# Patient Record
Sex: MALE | Race: BLACK OR AFRICAN AMERICAN | Employment: UNEMPLOYED | URBAN - METROPOLITAN AREA
[De-identification: names, ages, dates, MRNs, and addresses within clinical notes are randomized per-mention and may not be internally consistent; named-entity substitution may affect disease eponyms.]

---

## 2020-08-10 ENCOUNTER — HOSPITAL ENCOUNTER (OUTPATIENT)
Age: 45
Setting detail: SPECIMEN
Discharge: HOME | End: 2020-08-10
Payer: COMMERCIAL

## 2020-08-11 LAB
C. TRACHOMATIS DNA ,URINE: NEGATIVE
N. GONORRHOEAE DNA, URINE: NEGATIVE
SOURCE: NORMAL
SPECIMEN DESCRIPTION: NORMAL
TRICHOMONAS VAGINALI, MOLECULAR: NEGATIVE

## 2020-08-24 ENCOUNTER — HOSPITAL ENCOUNTER (OUTPATIENT)
Age: 45
Setting detail: SPECIMEN
Discharge: HOME | End: 2020-08-24
Payer: COMMERCIAL

## 2020-08-24 LAB
ABSOLUTE EOS #: 0.13 K/UL (ref 0–0.44)
ABSOLUTE IMMATURE GRANULOCYTE: <0.03 K/UL (ref 0–0.3)
ABSOLUTE LYMPH #: 1.95 K/UL (ref 1.1–3.7)
ABSOLUTE MONO #: 0.83 K/UL (ref 0.1–1.2)
ALBUMIN SERPL-MCNC: 4.1 G/DL (ref 3.5–5.2)
ALBUMIN/GLOBULIN RATIO: 1.3 (ref 1–2.5)
ALP BLD-CCNC: 58 U/L (ref 40–129)
ALT SERPL-CCNC: 24 U/L (ref 5–41)
ANION GAP SERPL CALCULATED.3IONS-SCNC: 15 MMOL/L (ref 9–17)
AST SERPL-CCNC: 12 U/L
BASOPHILS # BLD: 1 % (ref 0–2)
BASOPHILS ABSOLUTE: 0.04 K/UL (ref 0–0.2)
BILIRUB SERPL-MCNC: 0.15 MG/DL (ref 0.3–1.2)
BUN BLDV-MCNC: 13 MG/DL (ref 6–20)
BUN/CREAT BLD: ABNORMAL (ref 9–20)
CALCIUM SERPL-MCNC: 9.4 MG/DL (ref 8.6–10.4)
CHLORIDE BLD-SCNC: 98 MMOL/L (ref 98–107)
CHOLESTEROL/HDL RATIO: 4.7
CHOLESTEROL: 179 MG/DL
CO2: 23 MMOL/L (ref 20–31)
CREAT SERPL-MCNC: 0.84 MG/DL (ref 0.7–1.2)
DIFFERENTIAL TYPE: NORMAL
EOSINOPHILS RELATIVE PERCENT: 2 % (ref 1–4)
GFR AFRICAN AMERICAN: >60 ML/MIN
GFR NON-AFRICAN AMERICAN: >60 ML/MIN
GFR SERPL CREATININE-BSD FRML MDRD: ABNORMAL ML/MIN/{1.73_M2}
GFR SERPL CREATININE-BSD FRML MDRD: ABNORMAL ML/MIN/{1.73_M2}
GLUCOSE BLD-MCNC: 195 MG/DL (ref 70–99)
HCT VFR BLD CALC: 47.9 % (ref 40.7–50.3)
HDLC SERPL-MCNC: 38 MG/DL
HEMOGLOBIN: 14.6 G/DL (ref 13–17)
IMMATURE GRANULOCYTES: 0 %
LDL CHOLESTEROL: 90 MG/DL (ref 0–130)
LYMPHOCYTES # BLD: 27 % (ref 24–43)
MCH RBC QN AUTO: 26.8 PG (ref 25.2–33.5)
MCHC RBC AUTO-ENTMCNC: 30.5 G/DL (ref 28.4–34.8)
MCV RBC AUTO: 87.9 FL (ref 82.6–102.9)
MONOCYTES # BLD: 12 % (ref 3–12)
NRBC AUTOMATED: 0 PER 100 WBC
PDW BLD-RTO: 13.4 % (ref 11.8–14.4)
PLATELET # BLD: 352 K/UL (ref 138–453)
PLATELET ESTIMATE: NORMAL
PMV BLD AUTO: 10.4 FL (ref 8.1–13.5)
POTASSIUM SERPL-SCNC: 4.1 MMOL/L (ref 3.7–5.3)
RBC # BLD: 5.45 M/UL (ref 4.21–5.77)
RBC # BLD: NORMAL 10*6/UL
SEG NEUTROPHILS: 58 % (ref 36–65)
SEGMENTED NEUTROPHILS ABSOLUTE COUNT: 4.18 K/UL (ref 1.5–8.1)
SODIUM BLD-SCNC: 136 MMOL/L (ref 135–144)
TOTAL PROTEIN: 7.2 G/DL (ref 6.4–8.3)
TRIGL SERPL-MCNC: 253 MG/DL
VLDLC SERPL CALC-MCNC: ABNORMAL MG/DL (ref 1–30)
WBC # BLD: 7.2 K/UL (ref 3.5–11.3)
WBC # BLD: NORMAL 10*3/UL

## 2021-02-12 ENCOUNTER — HOSPITAL ENCOUNTER (OUTPATIENT)
Age: 46
Setting detail: SPECIMEN
Discharge: HOME | End: 2021-02-12
Payer: COMMERCIAL

## 2021-02-12 LAB
ABSOLUTE EOS #: 0.14 K/UL (ref 0–0.44)
ABSOLUTE IMMATURE GRANULOCYTE: 0.03 K/UL (ref 0–0.3)
ABSOLUTE LYMPH #: 2.16 K/UL (ref 1.1–3.7)
ABSOLUTE MONO #: 0.79 K/UL (ref 0.1–1.2)
ALBUMIN SERPL-MCNC: 4.2 G/DL (ref 3.5–5.2)
ALBUMIN/GLOBULIN RATIO: 1.2 (ref 1–2.5)
ALP BLD-CCNC: 53 U/L (ref 40–129)
ALT SERPL-CCNC: 14 U/L (ref 5–41)
ANION GAP SERPL CALCULATED.3IONS-SCNC: 17 MMOL/L (ref 9–17)
AST SERPL-CCNC: 9 U/L
BASOPHILS # BLD: 1 % (ref 0–2)
BASOPHILS ABSOLUTE: 0.06 K/UL (ref 0–0.2)
BILIRUB SERPL-MCNC: 0.23 MG/DL (ref 0.3–1.2)
BUN BLDV-MCNC: 19 MG/DL (ref 6–20)
BUN/CREAT BLD: ABNORMAL (ref 9–20)
CALCIUM SERPL-MCNC: 10.3 MG/DL (ref 8.6–10.4)
CHLORIDE BLD-SCNC: 102 MMOL/L (ref 98–107)
CHOLESTEROL/HDL RATIO: 4.3
CHOLESTEROL: 186 MG/DL
CO2: 23 MMOL/L (ref 20–31)
CREAT SERPL-MCNC: 1.12 MG/DL (ref 0.7–1.2)
DIFFERENTIAL TYPE: NORMAL
EOSINOPHILS RELATIVE PERCENT: 2 % (ref 1–4)
GFR AFRICAN AMERICAN: >60 ML/MIN
GFR NON-AFRICAN AMERICAN: >60 ML/MIN
GFR SERPL CREATININE-BSD FRML MDRD: ABNORMAL ML/MIN/{1.73_M2}
GFR SERPL CREATININE-BSD FRML MDRD: ABNORMAL ML/MIN/{1.73_M2}
GLUCOSE BLD-MCNC: 140 MG/DL (ref 70–99)
HCT VFR BLD CALC: 43.7 % (ref 40.7–50.3)
HDLC SERPL-MCNC: 43 MG/DL
HEMOGLOBIN: 13.6 G/DL (ref 13–17)
IMMATURE GRANULOCYTES: 0 %
LDL CHOLESTEROL: 107 MG/DL (ref 0–130)
LYMPHOCYTES # BLD: 31 % (ref 24–43)
MCH RBC QN AUTO: 25.7 PG (ref 25.2–33.5)
MCHC RBC AUTO-ENTMCNC: 31.1 G/DL (ref 28.4–34.8)
MCV RBC AUTO: 82.6 FL (ref 82.6–102.9)
MONOCYTES # BLD: 11 % (ref 3–12)
NRBC AUTOMATED: 0 PER 100 WBC
PDW BLD-RTO: 13.9 % (ref 11.8–14.4)
PLATELET # BLD: 301 K/UL (ref 138–453)
PLATELET ESTIMATE: NORMAL
PMV BLD AUTO: 10.4 FL (ref 8.1–13.5)
POTASSIUM SERPL-SCNC: 4.8 MMOL/L (ref 3.7–5.3)
RBC # BLD: 5.29 M/UL (ref 4.21–5.77)
RBC # BLD: NORMAL 10*6/UL
SEG NEUTROPHILS: 55 % (ref 36–65)
SEGMENTED NEUTROPHILS ABSOLUTE COUNT: 3.82 K/UL (ref 1.5–8.1)
SODIUM BLD-SCNC: 142 MMOL/L (ref 135–144)
TOTAL PROTEIN: 7.6 G/DL (ref 6.4–8.3)
TRIGL SERPL-MCNC: 181 MG/DL
VLDLC SERPL CALC-MCNC: ABNORMAL MG/DL (ref 1–30)
WBC # BLD: 7 K/UL (ref 3.5–11.3)
WBC # BLD: NORMAL 10*3/UL

## 2021-07-28 ENCOUNTER — HOSPITAL ENCOUNTER (OUTPATIENT)
Age: 46
Setting detail: SPECIMEN
Discharge: HOME | End: 2021-07-28
Payer: COMMERCIAL

## 2021-07-28 LAB
CHOLESTEROL/HDL RATIO: 3.8
CHOLESTEROL: 160 MG/DL
HDLC SERPL-MCNC: 42 MG/DL
LDL CHOLESTEROL: 73 MG/DL (ref 0–130)
TRIGL SERPL-MCNC: 226 MG/DL
VLDLC SERPL CALC-MCNC: ABNORMAL MG/DL (ref 1–30)

## 2021-10-11 ENCOUNTER — HOSPITAL ENCOUNTER (OUTPATIENT)
Age: 46
Setting detail: SPECIMEN
Discharge: HOME | End: 2021-10-11

## 2021-11-20 ENCOUNTER — HOSPITAL ENCOUNTER (INPATIENT)
Age: 46
LOS: 3 days | Discharge: HOME | DRG: 871 | End: 2021-11-23
Attending: INTERNAL MEDICINE | Admitting: INTERNAL MEDICINE
Payer: COMMERCIAL

## 2021-11-20 ENCOUNTER — APPOINTMENT (OUTPATIENT)
Dept: GENERAL RADIOLOGY | Age: 46
DRG: 871 | End: 2021-11-20
Payer: COMMERCIAL

## 2021-11-20 ENCOUNTER — APPOINTMENT (OUTPATIENT)
Dept: CT IMAGING | Age: 46
DRG: 871 | End: 2021-11-20
Payer: COMMERCIAL

## 2021-11-20 DIAGNOSIS — U07.1 COVID-19: Primary | ICD-10-CM

## 2021-11-20 PROBLEM — J12.82 PNEUMONIA DUE TO COVID-19 VIRUS: Status: ACTIVE | Noted: 2021-11-20

## 2021-11-20 LAB
ADENOVIRUS F 40 41 PCR: NOT DETECTED
ALBUMIN SERPL-MCNC: 3.8 G/DL (ref 3.5–5.1)
ALP BLD-CCNC: 57 U/L (ref 38–126)
ALT SERPL-CCNC: 42 U/L (ref 11–66)
ANION GAP SERPL CALCULATED.3IONS-SCNC: 12 MEQ/L (ref 8–16)
AST SERPL-CCNC: 30 U/L (ref 5–40)
ASTROVIRUS PCR: NOT DETECTED
ATYPICAL LYMPHOCYTES: ABNORMAL %
BASOPHILS # BLD: 0.2 %
BASOPHILS ABSOLUTE: 0 THOU/MM3 (ref 0–0.1)
BILIRUB SERPL-MCNC: 0.3 MG/DL (ref 0.3–1.2)
BUN BLDV-MCNC: 17 MG/DL (ref 7–22)
C DIFF TOXIN/ANTIGEN: NEGATIVE
C-REACTIVE PROTEIN: 10.44 MG/DL (ref 0–1)
CALCIUM SERPL-MCNC: 8.7 MG/DL (ref 8.5–10.5)
CAMPYLOBACTER PCR: NOT DETECTED
CHLORIDE BLD-SCNC: 91 MEQ/L (ref 98–111)
CLOSTRIDIUM DIFFICILE, PCR: ABNORMAL
CO2: 28 MEQ/L (ref 23–33)
CREAT SERPL-MCNC: 1.2 MG/DL (ref 0.4–1.2)
CRYPTOSPORIDIUM PCR: NOT DETECTED
CYCLOSPORA CAYETANENSIS PCR: NOT DETECTED
E COLI 0157 PCR: ABNORMAL
E COLI ENTEROAGGREGATIVE PCR: DETECTED
E COLI ENTEROPATHOGENIC PCR: NOT DETECTED
E COLI ENTEROTOXIGENIC PCR: NOT DETECTED
E COLI SHIGA LIKE TOXIN PCR: NOT DETECTED
E COLI SHIGELLA/ENTEROINVASIVE PCR: NOT DETECTED
E HISTOLYTICA GI FILM ARRAY: NOT DETECTED
EOSINOPHIL # BLD: 0 %
EOSINOPHILS ABSOLUTE: 0 THOU/MM3 (ref 0–0.4)
ERYTHROCYTE [DISTWIDTH] IN BLOOD BY AUTOMATED COUNT: 13.8 % (ref 11.5–14.5)
ERYTHROCYTE [DISTWIDTH] IN BLOOD BY AUTOMATED COUNT: 41.7 FL (ref 35–45)
FERRITIN: 1361 NG/ML (ref 22–322)
GFR SERPL CREATININE-BSD FRML MDRD: 79 ML/MIN/1.73M2
GIARDIA LAMBLIA PCR: NOT DETECTED
GLUCOSE BLD-MCNC: 265 MG/DL (ref 70–108)
GLUCOSE BLD-MCNC: 298 MG/DL (ref 70–108)
GLUCOSE BLD-MCNC: 308 MG/DL (ref 70–108)
HCT VFR BLD CALC: 44.7 % (ref 42–52)
HEMOGLOBIN: 14.6 GM/DL (ref 14–18)
IMMATURE GRANS (ABS): 0.02 THOU/MM3 (ref 0–0.07)
IMMATURE GRANULOCYTES: 0.4 %
LACTIC ACID, SEPSIS: 1.1 MMOL/L (ref 0.5–1.9)
LYMPHOCYTES # BLD: 23.5 %
LYMPHOCYTES ABSOLUTE: 1.1 THOU/MM3 (ref 1–4.8)
MCH RBC QN AUTO: 27.2 PG (ref 26–33)
MCHC RBC AUTO-ENTMCNC: 32.7 GM/DL (ref 32.2–35.5)
MCV RBC AUTO: 83.4 FL (ref 80–94)
MONOCYTES # BLD: 11.9 %
MONOCYTES ABSOLUTE: 0.6 THOU/MM3 (ref 0.4–1.3)
NOROVIRUS GI GII PCR: NOT DETECTED
NUCLEATED RED BLOOD CELLS: 0 /100 WBC
OSMOLALITY CALCULATION: 273.5 MOSMOL/KG (ref 275–300)
PHOSPHORUS: 3.1 MG/DL (ref 2.4–4.7)
PLATELET # BLD: 181 THOU/MM3 (ref 130–400)
PLATELET ESTIMATE: ADEQUATE
PLESIOMONAS SHIGELLOIDES PCR: NOT DETECTED
PMV BLD AUTO: 10.4 FL (ref 9.4–12.4)
POTASSIUM REFLEX MAGNESIUM: 4.5 MEQ/L (ref 3.5–5.2)
PRO-BNP: 33.4 PG/ML (ref 0–450)
PROCALCITONIN: 0.18 NG/ML (ref 0.01–0.09)
RBC # BLD: 5.36 MILL/MM3 (ref 4.7–6.1)
ROTAVIRUS A PCR: NOT DETECTED
SALMONELLA PCR: NOT DETECTED
SAPOVIRUS PCR: NOT DETECTED
SARS-COV-2, NAAT: DETECTED
SCAN OF BLOOD SMEAR: NORMAL
SEG NEUTROPHILS: 64 %
SEGMENTED NEUTROPHILS ABSOLUTE COUNT: 3 THOU/MM3 (ref 1.8–7.7)
SODIUM BLD-SCNC: 131 MEQ/L (ref 135–145)
TOTAL PROTEIN: 7.3 G/DL (ref 6.1–8)
TROPONIN T: < 0.01 NG/ML
VIBRIO CHOLERAE PCR: NOT DETECTED
VIBRIO PCR: NOT DETECTED
WBC # BLD: 4.7 THOU/MM3 (ref 4.8–10.8)
YERSINIA ENTEROCOLITICA PCR: NOT DETECTED

## 2021-11-20 PROCEDURE — 36415 COLL VENOUS BLD VENIPUNCTURE: CPT

## 2021-11-20 PROCEDURE — 86140 C-REACTIVE PROTEIN: CPT

## 2021-11-20 PROCEDURE — 83880 ASSAY OF NATRIURETIC PEPTIDE: CPT

## 2021-11-20 PROCEDURE — 6370000000 HC RX 637 (ALT 250 FOR IP): Performed by: PHYSICIAN ASSISTANT

## 2021-11-20 PROCEDURE — 6360000002 HC RX W HCPCS: Performed by: STUDENT IN AN ORGANIZED HEALTH CARE EDUCATION/TRAINING PROGRAM

## 2021-11-20 PROCEDURE — 71275 CT ANGIOGRAPHY CHEST: CPT

## 2021-11-20 PROCEDURE — 6360000002 HC RX W HCPCS: Performed by: PHYSICIAN ASSISTANT

## 2021-11-20 PROCEDURE — 84145 PROCALCITONIN (PCT): CPT

## 2021-11-20 PROCEDURE — 87635 SARS-COV-2 COVID-19 AMP PRB: CPT

## 2021-11-20 PROCEDURE — 0097U HC GI PTHGN MULT REV TRANS & AMP PRB TECH 22 TRGT: CPT

## 2021-11-20 PROCEDURE — 87427 SHIGA-LIKE TOXIN AG IA: CPT

## 2021-11-20 PROCEDURE — 2580000003 HC RX 258: Performed by: STUDENT IN AN ORGANIZED HEALTH CARE EDUCATION/TRAINING PROGRAM

## 2021-11-20 PROCEDURE — 87040 BLOOD CULTURE FOR BACTERIA: CPT

## 2021-11-20 PROCEDURE — 1200000000 HC SEMI PRIVATE

## 2021-11-20 PROCEDURE — 87045 FECES CULTURE AEROBIC BACT: CPT

## 2021-11-20 PROCEDURE — 2580000003 HC RX 258: Performed by: INTERNAL MEDICINE

## 2021-11-20 PROCEDURE — 80053 COMPREHEN METABOLIC PANEL: CPT

## 2021-11-20 PROCEDURE — 6360000004 HC RX CONTRAST MEDICATION: Performed by: PHYSICIAN ASSISTANT

## 2021-11-20 PROCEDURE — 6370000000 HC RX 637 (ALT 250 FOR IP): Performed by: STUDENT IN AN ORGANIZED HEALTH CARE EDUCATION/TRAINING PROGRAM

## 2021-11-20 PROCEDURE — 99223 1ST HOSP IP/OBS HIGH 75: CPT | Performed by: INTERNAL MEDICINE

## 2021-11-20 PROCEDURE — 96374 THER/PROPH/DIAG INJ IV PUSH: CPT

## 2021-11-20 PROCEDURE — 99285 EMERGENCY DEPT VISIT HI MDM: CPT

## 2021-11-20 PROCEDURE — 94640 AIRWAY INHALATION TREATMENT: CPT

## 2021-11-20 PROCEDURE — 85025 COMPLETE CBC W/AUTO DIFF WBC: CPT

## 2021-11-20 PROCEDURE — XW0DXM6 INTRODUCTION OF BARICITINIB INTO MOUTH AND PHARYNX, EXTERNAL APPROACH, NEW TECHNOLOGY GROUP 6: ICD-10-PCS | Performed by: INTERNAL MEDICINE

## 2021-11-20 PROCEDURE — 87449 NOS EACH ORGANISM AG IA: CPT

## 2021-11-20 PROCEDURE — 2580000003 HC RX 258: Performed by: PHYSICIAN ASSISTANT

## 2021-11-20 PROCEDURE — 93005 ELECTROCARDIOGRAM TRACING: CPT | Performed by: PHYSICIAN ASSISTANT

## 2021-11-20 PROCEDURE — 71045 X-RAY EXAM CHEST 1 VIEW: CPT

## 2021-11-20 PROCEDURE — 6360000002 HC RX W HCPCS: Performed by: INTERNAL MEDICINE

## 2021-11-20 PROCEDURE — 82948 REAGENT STRIP/BLOOD GLUCOSE: CPT

## 2021-11-20 PROCEDURE — 84100 ASSAY OF PHOSPHORUS: CPT

## 2021-11-20 PROCEDURE — 6370000000 HC RX 637 (ALT 250 FOR IP): Performed by: INTERNAL MEDICINE

## 2021-11-20 PROCEDURE — 82728 ASSAY OF FERRITIN: CPT

## 2021-11-20 PROCEDURE — 83605 ASSAY OF LACTIC ACID: CPT

## 2021-11-20 PROCEDURE — 84484 ASSAY OF TROPONIN QUANT: CPT

## 2021-11-20 RX ORDER — GLIPIZIDE 10 MG/1
TABLET, FILM COATED, EXTENDED RELEASE ORAL
COMMUNITY
Start: 2021-10-19

## 2021-11-20 RX ORDER — ATORVASTATIN CALCIUM 20 MG/1
20 TABLET, FILM COATED ORAL DAILY
Status: DISCONTINUED | OUTPATIENT
Start: 2021-11-20 | End: 2021-11-23 | Stop reason: HOSPADM

## 2021-11-20 RX ORDER — ONDANSETRON 2 MG/ML
4 INJECTION INTRAMUSCULAR; INTRAVENOUS ONCE
Status: COMPLETED | OUTPATIENT
Start: 2021-11-20 | End: 2021-11-20

## 2021-11-20 RX ORDER — ONDANSETRON 4 MG/1
4 TABLET, ORALLY DISINTEGRATING ORAL EVERY 8 HOURS PRN
Status: DISCONTINUED | OUTPATIENT
Start: 2021-11-20 | End: 2021-11-23 | Stop reason: HOSPADM

## 2021-11-20 RX ORDER — SODIUM CHLORIDE 0.9 % (FLUSH) 0.9 %
5-40 SYRINGE (ML) INJECTION PRN
Status: DISCONTINUED | OUTPATIENT
Start: 2021-11-20 | End: 2021-11-23 | Stop reason: HOSPADM

## 2021-11-20 RX ORDER — 0.9 % SODIUM CHLORIDE 0.9 %
1000 INTRAVENOUS SOLUTION INTRAVENOUS ONCE
Status: COMPLETED | OUTPATIENT
Start: 2021-11-20 | End: 2021-11-20

## 2021-11-20 RX ORDER — 0.9 % SODIUM CHLORIDE 0.9 %
500 INTRAVENOUS SOLUTION INTRAVENOUS ONCE
Status: DISCONTINUED | OUTPATIENT
Start: 2021-11-20 | End: 2021-11-20

## 2021-11-20 RX ORDER — LANCETS
EACH MISCELLANEOUS
COMMUNITY
Start: 2021-09-02 | End: 2021-11-20

## 2021-11-20 RX ORDER — ONDANSETRON 2 MG/ML
4 INJECTION INTRAMUSCULAR; INTRAVENOUS EVERY 6 HOURS PRN
Status: DISCONTINUED | OUTPATIENT
Start: 2021-11-20 | End: 2021-11-23 | Stop reason: HOSPADM

## 2021-11-20 RX ORDER — TADALAFIL 5 MG/1
TABLET ORAL
COMMUNITY
Start: 2021-10-01

## 2021-11-20 RX ORDER — IPRATROPIUM BROMIDE AND ALBUTEROL SULFATE 2.5; .5 MG/3ML; MG/3ML
1 SOLUTION RESPIRATORY (INHALATION) ONCE
Status: COMPLETED | OUTPATIENT
Start: 2021-11-20 | End: 2021-11-20

## 2021-11-20 RX ORDER — PIOGLITAZONEHYDROCHLORIDE 45 MG/1
TABLET ORAL
COMMUNITY
Start: 2021-09-22

## 2021-11-20 RX ORDER — METFORMIN HYDROCHLORIDE 500 MG/1
TABLET, EXTENDED RELEASE ORAL
COMMUNITY
Start: 2021-08-25

## 2021-11-20 RX ORDER — GUAIFENESIN 100 MG/5ML
200 SOLUTION ORAL ONCE
Status: COMPLETED | OUTPATIENT
Start: 2021-11-20 | End: 2021-11-20

## 2021-11-20 RX ORDER — SODIUM CHLORIDE 9 MG/ML
INJECTION, SOLUTION INTRAVENOUS CONTINUOUS
Status: DISCONTINUED | OUTPATIENT
Start: 2021-11-20 | End: 2021-11-23

## 2021-11-20 RX ORDER — SODIUM CHLORIDE 0.9 % (FLUSH) 0.9 %
5-40 SYRINGE (ML) INJECTION EVERY 12 HOURS SCHEDULED
Status: DISCONTINUED | OUTPATIENT
Start: 2021-11-20 | End: 2021-11-23 | Stop reason: HOSPADM

## 2021-11-20 RX ORDER — TADALAFIL 10 MG/1
TABLET ORAL
COMMUNITY
Start: 2021-10-21

## 2021-11-20 RX ORDER — POLYETHYLENE GLYCOL 3350 17 G/17G
17 POWDER, FOR SOLUTION ORAL DAILY PRN
Status: DISCONTINUED | OUTPATIENT
Start: 2021-11-20 | End: 2021-11-23 | Stop reason: HOSPADM

## 2021-11-20 RX ORDER — DEXAMETHASONE 4 MG/1
8 TABLET ORAL ONCE
Status: COMPLETED | OUTPATIENT
Start: 2021-11-20 | End: 2021-11-20

## 2021-11-20 RX ORDER — ACETAMINOPHEN 325 MG/1
650 TABLET ORAL EVERY 6 HOURS PRN
Status: DISCONTINUED | OUTPATIENT
Start: 2021-11-20 | End: 2021-11-23 | Stop reason: HOSPADM

## 2021-11-20 RX ORDER — HYDROCHLOROTHIAZIDE 12.5 MG/1
CAPSULE, GELATIN COATED ORAL
COMMUNITY
Start: 2021-10-19

## 2021-11-20 RX ORDER — LISINOPRIL 20 MG/1
TABLET ORAL
COMMUNITY
Start: 2021-10-19

## 2021-11-20 RX ORDER — DEXTROSE MONOHYDRATE 50 MG/ML
100 INJECTION, SOLUTION INTRAVENOUS PRN
Status: DISCONTINUED | OUTPATIENT
Start: 2021-11-20 | End: 2021-11-23 | Stop reason: HOSPADM

## 2021-11-20 RX ORDER — DEXTROSE MONOHYDRATE 25 G/50ML
12.5 INJECTION, SOLUTION INTRAVENOUS PRN
Status: DISCONTINUED | OUTPATIENT
Start: 2021-11-20 | End: 2021-11-23 | Stop reason: HOSPADM

## 2021-11-20 RX ORDER — DEXAMETHASONE SODIUM PHOSPHATE 4 MG/ML
6 INJECTION, SOLUTION INTRA-ARTICULAR; INTRALESIONAL; INTRAMUSCULAR; INTRAVENOUS; SOFT TISSUE EVERY 24 HOURS
Status: DISCONTINUED | OUTPATIENT
Start: 2021-11-21 | End: 2021-11-23 | Stop reason: HOSPADM

## 2021-11-20 RX ORDER — ACETAMINOPHEN 650 MG/1
650 SUPPOSITORY RECTAL EVERY 6 HOURS PRN
Status: DISCONTINUED | OUTPATIENT
Start: 2021-11-20 | End: 2021-11-23 | Stop reason: HOSPADM

## 2021-11-20 RX ORDER — ATORVASTATIN CALCIUM 20 MG/1
TABLET, FILM COATED ORAL
COMMUNITY
Start: 2021-10-19

## 2021-11-20 RX ORDER — SODIUM CHLORIDE 9 MG/ML
25 INJECTION, SOLUTION INTRAVENOUS PRN
Status: DISCONTINUED | OUTPATIENT
Start: 2021-11-20 | End: 2021-11-23 | Stop reason: HOSPADM

## 2021-11-20 RX ORDER — ACETAMINOPHEN 500 MG
1000 TABLET ORAL ONCE
Status: COMPLETED | OUTPATIENT
Start: 2021-11-20 | End: 2021-11-20

## 2021-11-20 RX ORDER — NICOTINE POLACRILEX 4 MG
15 LOZENGE BUCCAL PRN
Status: DISCONTINUED | OUTPATIENT
Start: 2021-11-20 | End: 2021-11-23 | Stop reason: HOSPADM

## 2021-11-20 RX ADMIN — DEXAMETHASONE 8 MG: 4 TABLET ORAL at 12:23

## 2021-11-20 RX ADMIN — IPRATROPIUM BROMIDE AND ALBUTEROL SULFATE 1 AMPULE: .5; 3 SOLUTION RESPIRATORY (INHALATION) at 12:36

## 2021-11-20 RX ADMIN — SODIUM CHLORIDE: 9 INJECTION, SOLUTION INTRAVENOUS at 16:15

## 2021-11-20 RX ADMIN — ATORVASTATIN CALCIUM 20 MG: 20 TABLET, FILM COATED ORAL at 21:37

## 2021-11-20 RX ADMIN — ENOXAPARIN SODIUM 40 MG: 100 INJECTION SUBCUTANEOUS at 21:35

## 2021-11-20 RX ADMIN — SODIUM CHLORIDE 1000 ML: 9 INJECTION, SOLUTION INTRAVENOUS at 12:07

## 2021-11-20 RX ADMIN — ONDANSETRON 4 MG: 2 INJECTION INTRAMUSCULAR; INTRAVENOUS at 12:23

## 2021-11-20 RX ADMIN — ACETAMINOPHEN 1000 MG: 500 TABLET ORAL at 12:24

## 2021-11-20 RX ADMIN — BARICITINIB 4 MG: 2 TABLET, FILM COATED ORAL at 18:19

## 2021-11-20 RX ADMIN — IOPAMIDOL 80 ML: 755 INJECTION, SOLUTION INTRAVENOUS at 13:43

## 2021-11-20 RX ADMIN — GUAIFENESIN 200 MG: 200 SOLUTION ORAL at 12:23

## 2021-11-20 RX ADMIN — AZITHROMYCIN DIHYDRATE 500 MG: 500 INJECTION, POWDER, LYOPHILIZED, FOR SOLUTION INTRAVENOUS at 23:55

## 2021-11-20 RX ADMIN — SODIUM CHLORIDE 1000 ML: 9 INJECTION, SOLUTION INTRAVENOUS at 17:45

## 2021-11-20 RX ADMIN — INSULIN LISPRO 9 UNITS: 100 INJECTION, SOLUTION INTRAVENOUS; SUBCUTANEOUS at 18:56

## 2021-11-20 ASSESSMENT — ENCOUNTER SYMPTOMS
VOMITING: 0
WHEEZING: 1
DIARRHEA: 1
SORE THROAT: 0
CHEST TIGHTNESS: 1
ABDOMINAL PAIN: 0
SHORTNESS OF BREATH: 1

## 2021-11-20 NOTE — LETTER
[address] [facility] [address]  Phone: [phone]             November 23, 2021    Patient: Niya Cao. YOB: 1975   Date of Visit: 11/20/2021       To Whom It May Concern:    Marilou Montenegro was seen and treated in our facility  beginning 11/20/2021 until 11/23/2021. He may return to work on once cleared from family physican .       Sincerely,       Dr. Cynthia Swan         Signature:__________________________________

## 2021-11-20 NOTE — ED NOTES
Resting in bed. Requesting food. Food tray ordered. Voiced no other concerns. Reports \"feeling better\". Side rails up x2. Call light within reach.  Will monitor      Antonette Beard RN  11/20/21 5250

## 2021-11-20 NOTE — ED TRIAGE NOTES
Presents to ER with concerns of not feeling well. Pt reports he has felt ill since last Friday. Today pt has HA, fever, diarrhea, weakness. Pt unsure if he has been exposed to covid 19. Pt 90% upon arrival to ER. Placed on 2 L NC oxygen up to 95%.  Will monitor

## 2021-11-20 NOTE — H&P
Hospitalist - History & Physical      Patient: Niya Cao. Unit/Bed:[address] [place] [address]  YOB: 1975  MRN: [id number]   Acct: [de-identified]   PCP: Tamia Quintana, ARLEEN - NP    Date of Service: Pt seen/examined on 11/20/21  and Admitted to Inpatient with expected LOS greater than two midnights due to medical therapy. Chief Complaint:  Fatigue with diarrhea/nausea/vomiting    Assessment and Plan:  Acute hypoxic respiratory failure due to COVID-19 pneumonia: SIRS 3/4 (fever, tachycardia, tachypnea). Unvaccinated. Febrile (Tmax 101. 3) with cough, nausea, vomiting, diarrhea. Procal 0.18. CRP 10 Associated tachypnea intermittently. Will start on Decadron 6mg PO daily. Meets criteria for Baricitinib- pharmacy consulted. No indications for antibiotics at this time. Will give another 1L bolus and start on  cc/hr. Currently on 3L NC saturating 94%. Wean off as needed with SpO2 goal >90%. Not on oxygen at baseline. Current smoker. No other history of lung disease. Acute Diarrhea: with associated nausea and vomiting. Likely due to COVID-19. Has been going on for the last few days- unclear timeline. Will check GI panel to rule out other infectious cause. Anti-emetics prn. Patient given food in the ED and wants to eat so will give diet. Monitor. On IVF. PTOT. Mild hypovolemic hyponatremia: Na 131 on admission. Likely due to acute viral pneumonia with diarrhea. On IVF. Trend daily. Primary HTN: controlled. On Norvasc, HCTZ, Lisinopril, Troprol-XL. Holding at this time. Resume as appropriate. Monitor vitals and BMP. NIDDMII: HbA1c not on Epic. Will check it. On Metformin ER, Glipizide, and Actos at home- held inpatient. On high dose SSI. BG inpatient goal 140-180. Monitor and adjust as needed. HLD: on statin. Home meds include Lipitor 20 mg daily as well as Pravastatin 40 mg daily? Will do med reconciliation. SAM: will clarify if using CPAP.         Code Status:FULL    Tele:   [x] yes [] no    Diet: regular    DVT prophylaxis: [x] Lovenox                                 [] SCDs                                 [] SQ Heparin                                 [] Encourage ambulation                                 [] Already on Anticoagulation      Disposition:      [] Home                             [] TCU                             [] Rehab                             [] Psych                             [] SNF                             [] [facility]                             [x] Other- TBD. History Of Present Illness:    56 yo M current smoker with history of SAM, HTN, and T2DM presented to [facility] ED with complaints of acute generalized weakness, diarrhea, and dry cough x 1 week. Presented febrile 101.3 with associated tachycardia 105 and tachypnea 24. Saturating 90 on RA. Placed on 3L NC. Labs significant for Na 131, Ferritin 1361, CRP 10.44, COVID positive. Lactate normal. No leukocytosis. CXR showed new bilateral pulmonary opacities. CTA chest with no PE, revealed mild to moderate bilateral patchy pulmonary opacities consistent with infiltrates. Given 1L IVNS and Decadron 8 mg PO x1 dose in the ED. Admitted for acute hypoxic respiratory failure. On evaluation, patient reports his symptoms of cough, fatigue started last weekend. Has had diarrhea, nausea, vomiting over the last few days. Could not go to work yesterday due to generalized weakness, nausea, vomiting. Smokes ~0.5 ppd x 20+ years. No alcohol or other drug use. Currently feeling tired, nauseated. Last emesis today. But wants to eat, feeling hungry.        Past Medical History:        Diagnosis Date    Heartburn     Hyperlipidemia     Hypertension     Restless legs syndrome     Sleep apnea     SOB (shortness of breath)     Type II or unspecified type diabetes mellitus without mention of complication, not stated as uncontrolled [facility])        Past Surgical History:      Procedure Laterality Date    CYST REMOVAL      wrist       Home Medications:   No current facility-administered medications on file prior to encounter. Current Outpatient Medications on File Prior to Encounter   Medication Sig Dispense Refill    metoprolol (TOPROL-XL) 50 MG XL tablet Take 1 tablet by mouth daily. 30 tablet 0    pravastatin (PRAVACHOL) 40 MG tablet Take 1 tablet by mouth daily. 30 tablet 0    amLODIPine (NORVASC) 10 MG tablet Take 1 tablet by mouth daily for 30 days. 30 tablet 0    metFORMIN ER, MOD, (GLUMETZA) 1000 MG ER tablet Take 1 tablet by mouth 2 times daily (with meals) for 30 days. 60 tablet 0    GLIPIZIDE XL 10 MG extended release tablet TAKE ONE TABLET BY MOUTH TWICE DAILY with meals      hydroCHLOROthiazide (MICROZIDE) 12.5 MG capsule TAKE ONE CAPSULE BY MOUTH ONCE DAILY      Accu-Chek Softclix Lancets MISC USE TO CHECK BLOOD SUGARS TWICE DAILY      lisinopril (PRINIVIL;ZESTRIL) 20 MG tablet TAKE ONE TABLET BY MOUTH ONCE DAILY      tadalafil (CIALIS) 5 MG tablet TAKE ONE TABLET BY MOUTH DAILY ONLY AS NEEDED      tadalafil (CIALIS) 10 MG tablet TAKE ONE TABLET BY MOUTH ONCE DAILY AS NEEDED      pioglitazone (ACTOS) 45 MG tablet TAKE ONE TABLET BY MOUTH ONCE DAILY      metFORMIN (GLUCOPHAGE-XR) 500 MG extended release tablet TAKE TWO TABLETS BY MOUTH TWICE DAILY      atorvastatin (LIPITOR) 20 MG tablet TAKE 1 TABLET BY MOUTH ONCE A DAY      aspirin 81 MG EC tablet Take 81 mg by mouth once. Allergies:    Patient has no known allergies. Social History:    reports that he has been smoking cigarettes. He has a 8.00 pack-year smoking history. He has never used smokeless tobacco. He reports current alcohol use. He reports current drug use. Drug: Marijuana Luigi Bath). Family History:       Problem Relation Age of Onset    Diabetes Father     Heart Disease Father        Diet:  No diet orders on file    Review of systems:   Pertinent positives as noted in the HPI.  All other systems reviewed and negative. PHYSICAL EXAM:  /83   Pulse 98   Temp 101.3 °F (38.5 °C) (Oral)   Resp 20   Ht 6' 6\" (1.981 m)   Wt (!) 340 lb (154.2 kg)   SpO2 (S) 94%   BMI 39.29 kg/m²   General appearance: No apparent distress, appears stated age and cooperative. Appears acutely ill and dehydrated. HEENT: Normal cephalic, atraumatic without obvious deformity. Extra ocular muscles intact. Conjunctivae/corneas clear. Neck: Supple, with full range of motion. No jugular venous distention. Trachea midline. Respiratory:  Normal respiratory effort. Diminished lung sounds bilaterally without Rales/Wheezes/Rhonchi. Cardiovascular: Regular rate and rhythm with normal S1/S2 without murmurs, rubs or gallops. Abdomen: Soft, non-tender but sensitive to palpation, non-distended. Musculoskeletal:  No clubbing, cyanosis or edema bilaterally. Skin: Skin color, texture, turgor normal.  No rashes or lesions. Neurologic:  Neurovascularly intact without any focal sensory/motor deficits. Cranial nerves: II-XII intact, grossly non-focal.  Psychiatric: Alert and oriented, thought content appropriate, normal insight  Capillary Refill: Brisk,< 3 seconds   Peripheral Pulses: +2 palpable, equal bilaterally     Labs:   Recent Labs     11/20/21  1211   WBC 4.7*   HGB 14.6   HCT 44.7        Recent Labs     11/20/21  1211   *   K 4.5   CL 91*   CO2 28   BUN 17   CREATININE 1.2   CALCIUM 8.7     Recent Labs     11/20/21  1211   AST 30   ALT 42   BILITOT 0.3   ALKPHOS 57     No results for input(s): INR in the last 72 hours. No results for input(s): Rhae Childes in the last 72 hours. Urinalysis:    Lab Results   Component Value Date    NITRU NEGATIVE 11/02/2014    WBCUA 0 11/02/2014    BACTERIA NONE 11/02/2014    RBCUA 0 11/02/2014    BLOODU TRACE 11/02/2014    SPECGRAV >1.030 11/02/2014       Radiology:   CTA CHEST W WO CONTRAST   Final Result       1. No pulmonary emboli.    2. Mild to moderate severity bilateral patchy pulmonary opacities consistent with infiltrates associated with Covid 19. **This report has been created using voice recognition software. It may contain minor errors which are inherent in voice recognition technology. **      Final report electronically signed by Dr. Liz Vera on 11/20/2021 1:50 PM      XR CHEST PORTABLE   Final Result   New bilateral pulmonary opacities consistent with pulmonary infiltrates. **This report has been created using voice recognition software. It may contain minor errors which are inherent in voice recognition technology. **      Final report electronically signed by Dr. Liz Vera on 11/20/2021 12:17 PM        CTA CHEST W WO CONTRAST    Result Date: 11/20/2021  PROCEDURE: CTA CHEST W WO CONTRAST CLINICAL INFORMATION: Covid; chest pain/SOB. COMPARISON: Chest x-ray 11/20/2021. TECHNIQUE: 3 mm axial images were obtained through the chest after the administration of IV contrast.  A non-contrast localizer was obtained. 3D reconstructions were performed on the scanner to include MIP coronal and sagittal images through the chest. Isovue was the intravenous contrast utilized. All CT scans at this facility use dose modulation, iterative reconstruction, and/or weight-based dosing when appropriate to reduce radiation dose to as low as reasonably achievable. FINDINGS: There is adequate opacification of the pulmonary arterial system. No pulmonary emboli are present. The aorta is within acceptable limits. The heart size is normal. There is no pericardial or pleural effusion. There is no mediastinal, axillary or hilar adenopathy. There are bilateral scattered patchy areas of peripheral round and irregularly shaped pulmonary opacities consistent with infiltrates associated with Covid. These are much better seen on CT than x-ray. No suspicious osseous lesions are present. There are no suspicious findings in the imaged aspects of the upper abdomen.       1. No pulmonary emboli. 2. Mild to moderate severity bilateral patchy pulmonary opacities consistent with infiltrates associated with Covid 19. **This report has been created using voice recognition software. It may contain minor errors which are inherent in voice recognition technology. ** Final report electronically signed by Dr. Joyce Frank on 11/20/2021 1:50 PM    XR CHEST PORTABLE    Result Date: 11/20/2021  PROCEDURE: XR CHEST PORTABLE CLINICAL INFORMATION: cough. Cough and congestion. COMPARISON: Chest 11/16/2011. TECHNIQUE: AP upright view of the chest. FINDINGS: The heart size is normal.The mediastinum is not widened. There are some patchy infiltrates in the left midlung zone, right midlung zone and right lung base. There are no associated pleural effusions. The pulmonary vascularity is normal.No suspicious osseous lesions are present. New bilateral pulmonary opacities consistent with pulmonary infiltrates. **This report has been created using voice recognition software. It may contain minor errors which are inherent in voice recognition technology. ** Final report electronically signed by Dr. Joyce Frank on 11/20/2021 12:17 PM        EKG: normal sinus rhythm, no blocks or conduction defects, no ischemic changes          Electronically signed by   Carmen Trejo MD   PGY2, Internal Medicine  11/20/2021

## 2021-11-20 NOTE — ED NOTES
Pt attempting to rest. Updated on poc. Verbalized understanding.  Will monitor      Myrtie Galeazzi, RN  11/20/21 8553

## 2021-11-20 NOTE — ED PROVIDER NOTES
[facility]  eMERGENCY dEPARTMENT eNCOUnter          CHIEF COMPLAINT       Chief Complaint   Patient presents with    Fatigue    Diarrhea       Nurses Notes reviewed and I agree except as noted inthe HPI. HISTORY OF PRESENT ILLNESS    Maron Aschoff. is a 55 y.o. male who presents to the Emergency Department for the evaluation of fatigue, diarrhea with cough. Patient states his symptoms began 8 to 9 days ago. Denied any known sick contacts prior to symptom onset. Symptoms have included fevers, chills, generalized body aches, cough, loss of taste and smell, chest tightness, wheezing, chest pain that occurs with cough as well as shortness of breath. He notes associated lightheadedness without syncope. No sore throat, abdominal pain or vomiting. He has been taking Tylenol Cold and flu with some improvement in his symptoms. He has not received the Covid vaccine. Denies any pulmonary history and is a 1/2 pack/day smoker. Minimal oral intake over the past 3 days. The HPI was provided by the patient. REVIEW OF SYSTEMS     Review of Systems   Constitutional: Positive for chills, fatigue and fever. HENT: Negative for sore throat. Respiratory: Positive for chest tightness, shortness of breath and wheezing. Cardiovascular: Positive for chest pain. Gastrointestinal: Positive for diarrhea. Negative for abdominal pain and vomiting. Musculoskeletal: Positive for myalgias. Neurological: Positive for headaches. PAST MEDICAL HISTORY    has a past medical history of Heartburn, Hyperlipidemia, Hypertension, Restless legs syndrome, Sleep apnea, SOB (shortness of breath), and Type II or unspecified type diabetes mellitus without mention of complication, not stated as uncontrolled ([facility] [address]     SURGICAL HISTORY      has a past surgical history that includes cyst removal.    CURRENT MEDICATIONS       Previous Medications    ACCU-CHEK SOFTCLIX LANCETS MISC    USE TO CHECK BLOOD SUGARS TWICE DAILY    AMLODIPINE (NORVASC) 10 MG TABLET    Take 1 tablet by mouth daily for 30 days. ASPIRIN 81 MG EC TABLET    Take 81 mg by mouth once. ATORVASTATIN (LIPITOR) 20 MG TABLET    TAKE 1 TABLET BY MOUTH ONCE A DAY    GLIPIZIDE XL 10 MG EXTENDED RELEASE TABLET    TAKE ONE TABLET BY MOUTH TWICE DAILY with meals    HYDROCHLOROTHIAZIDE (MICROZIDE) 12.5 MG CAPSULE    TAKE ONE CAPSULE BY MOUTH ONCE DAILY    LISINOPRIL (PRINIVIL;ZESTRIL) 20 MG TABLET    TAKE ONE TABLET BY MOUTH ONCE DAILY    METFORMIN (GLUCOPHAGE-XR) 500 MG EXTENDED RELEASE TABLET    TAKE TWO TABLETS BY MOUTH TWICE DAILY    METFORMIN ER, MOD, (GLUMETZA) 1000 MG ER TABLET    Take 1 tablet by mouth 2 times daily (with meals) for 30 days. METOPROLOL (TOPROL-XL) 50 MG XL TABLET    Take 1 tablet by mouth daily. PIOGLITAZONE (ACTOS) 45 MG TABLET    TAKE ONE TABLET BY MOUTH ONCE DAILY    PRAVASTATIN (PRAVACHOL) 40 MG TABLET    Take 1 tablet by mouth daily. TADALAFIL (CIALIS) 10 MG TABLET    TAKE ONE TABLET BY MOUTH ONCE DAILY AS NEEDED    TADALAFIL (CIALIS) 5 MG TABLET    TAKE ONE TABLET BY MOUTH DAILY ONLY AS NEEDED       ALLERGIES     has No Known Allergies. FAMILY HISTORY     He indicated that his mother is alive. He indicated that his father is alive. family history includes Diabetes in his father; Heart Disease in his father. SOCIAL HISTORY      reports that he has been smoking cigarettes. He has a 8.00 pack-year smoking history. He has never used smokeless tobacco. He reports current alcohol use. He reports current drug use. Drug: Marijuana Luigi Bath). PHYSICAL EXAM     INITIAL VITALS:  height is 6' 6\" (1.981 m) and weight is 340 lb (154.2 kg) (abnormal). His oral temperature is 101.3 °F (38.5 °C). His blood pressure is 126/83 and his pulse is 98. His respiration is 20 and oxygen saturation is 94% (significant). Physical Exam  Vitals and nursing note reviewed. Constitutional:       Appearance: Normal appearance. HENT:      Head: Normocephalic and atraumatic. Mouth/Throat:      Pharynx: Oropharynx is clear. Eyes:      Conjunctiva/sclera: Conjunctivae normal.   Cardiovascular:      Rate and Rhythm: Normal rate and regular rhythm. Heart sounds: Normal heart sounds. No murmur heard. Pulmonary:      Effort: Pulmonary effort is normal.      Breath sounds: Decreased breath sounds present. No wheezing or rhonchi. Abdominal:      Palpations: Abdomen is soft. Tenderness: There is generalized abdominal tenderness. There is no right CVA tenderness, left CVA tenderness, guarding or rebound. Musculoskeletal:         General: No tenderness. Cervical back: Normal range of motion. Right lower leg: No edema. Left lower leg: No edema. Skin:     General: Skin is warm and dry. Neurological:      General: No focal deficit present. Mental Status: He is alert and oriented to person, place, and time. Psychiatric:         Mood and Affect: Mood normal.         DIFFERENTIAL DIAGNOSIS:   Differential diagnoses are discussed    DIAGNOSTIC RESULTS     EKG: All EKG's are interpreted by the Emergency Department Physician who either signs or Co-signsthis chart in the absence of a cardiologist.    Vent. Rate: 99 bpm  PRinterval: 122 ms  QRS duration: 74 ms  QTc: 418 ms  P-R-T axes: 58, -2, 46  NSR. No STEMI. Compared to old EKG on 7-8-13      RADIOLOGY: non-plain film images(s) such as CT, Ultrasound and MRI are read by the radiologist.    [address] [place] [address]   Final Result       1. No pulmonary emboli. 2. Mild to moderate severity bilateral patchy pulmonary opacities consistent with infiltrates associated with Covid 19. **This report has been created using voice recognition software. It may contain minor errors which are inherent in voice recognition technology. **      Final report electronically signed by Dr. Abigail Garcias on 11/20/2021 1:50 PM      XR CHEST PORTABLE   Final Result New bilateral pulmonary opacities consistent with pulmonary infiltrates. **This report has been created using voice recognition software. It may contain minor errors which are inherent in voice recognition technology. **      Final report electronically signed by Dr. Na Menjivar on 11/20/2021 12:17 PM          LABS:      Labs Reviewed   COVID-19, RAPID - Abnormal; Notable for the following components:       Result Value    SARS-CoV-2, NAAT DETECTED (*)     All other components within normal limits   CBC WITH AUTO DIFFERENTIAL - Abnormal; Notable for the following components:    WBC 4.7 (*)     All other components within normal limits   COMPREHENSIVE METABOLIC PANEL W/ REFLEX TO MG FOR LOW K - Abnormal; Notable for the following components:    Glucose 265 (*)     Sodium 131 (*)     Chloride 91 (*)     All other components within normal limits   C-REACTIVE PROTEIN - Abnormal; Notable for the following components:    CRP 10.44 (*)     All other components within normal limits   PROCALCITONIN - Abnormal; Notable for the following components:    Procalcitonin 0.18 (*)     All other components within normal limits   FERRITIN - Abnormal; Notable for the following components:    Ferritin 1,361 (*)     All other components within normal limits   GLOMERULAR FILTRATION RATE, ESTIMATED - Abnormal; Notable for the following components:    Est, Glom Filt Rate 79 (*)     All other components within normal limits   OSMOLALITY - Abnormal; Notable for the following components:    Osmolality Calc 273.5 (*)     All other components within normal limits   TROPONIN   BRAIN NATRIURETIC PEPTIDE   LACTATE, SEPSIS   ANION GAP   SCAN OF BLOOD SMEAR       EMERGENCY DEPARTMENT COURSE:   Vitals:    Vitals:    11/20/21 1235 11/20/21 1252 11/20/21 1253 11/20/21 1254   BP:  126/83     Pulse:  98     Resp: 13 20     Temp:       TempSrc:       SpO2: 94% (S) 92% (S) 92% (S) 94%   Weight:       Height:          12:59 PM EST: The patient was seen and evaluated. Presents for complaints of viral symptoms that have been ongoing for the past 8 to 9 days. He arrives with oxygen saturation 90% on room air, does well on 2 to 3 L nasal cannula. He also arrives tachycardic and febrile. Covid test was positive. Labs reveal elevated ferritin, CRP with procalcitonin 0.18. Is hyperglycemic, cardiac markers within normal limits. Imaging shows mild to moderate severity bilateral patchy pulmonary opacities consistent with infiltrates associate with COVID-19 without pulmonary embolism. In the ED he was treated with IV fluids, Zofran, Tylenol, Decadron as well as DuoNeb and guaifenesin with improvement in symptoms. Discussed results with the patient who is agreeable with plan of admission. Discussed with the hospitalist service will admit for further management. CRITICAL CARE:   None    CONSULTS:  Hospitalist    PROCEDURES:  None    FINAL IMPRESSION      1. COVID-19          DISPOSITION/PLAN   Admit    PATIENT REFERRED TO:  No follow-up provider specified.     DISCHARGEMEDICATIONS:  New Prescriptions    No medications on file       (Please note that portions of this note were completedwith a voice recognition program.  Efforts were made to edit the dictations but occasionally words are mis-transcribed.)        Camellia Lennox, PA-C  11/20/21 2942

## 2021-11-21 LAB
ANION GAP SERPL CALCULATED.3IONS-SCNC: 9 MEQ/L (ref 8–16)
AVERAGE GLUCOSE: 204 MG/DL (ref 70–126)
BUN BLDV-MCNC: 17 MG/DL (ref 7–22)
C-REACTIVE PROTEIN: 6.89 MG/DL (ref 0–1)
CALCIUM SERPL-MCNC: 8.6 MG/DL (ref 8.5–10.5)
CHLORIDE BLD-SCNC: 97 MEQ/L (ref 98–111)
CO2: 28 MEQ/L (ref 23–33)
CREAT SERPL-MCNC: 1.1 MG/DL (ref 0.4–1.2)
EKG ATRIAL RATE: 99 BPM
EKG P AXIS: 58 DEGREES
EKG P-R INTERVAL: 122 MS
EKG Q-T INTERVAL: 326 MS
EKG QRS DURATION: 74 MS
EKG QTC CALCULATION (BAZETT): 418 MS
EKG R AXIS: -2 DEGREES
EKG T AXIS: 46 DEGREES
EKG VENTRICULAR RATE: 99 BPM
ERYTHROCYTE [DISTWIDTH] IN BLOOD BY AUTOMATED COUNT: 13.7 % (ref 11.5–14.5)
ERYTHROCYTE [DISTWIDTH] IN BLOOD BY AUTOMATED COUNT: 42.3 FL (ref 35–45)
GFR SERPL CREATININE-BSD FRML MDRD: 87 ML/MIN/1.73M2
GLUCOSE BLD-MCNC: 195 MG/DL (ref 70–108)
GLUCOSE BLD-MCNC: 237 MG/DL (ref 70–108)
GLUCOSE BLD-MCNC: 257 MG/DL (ref 70–108)
GLUCOSE BLD-MCNC: 305 MG/DL (ref 70–108)
GLUCOSE BLD-MCNC: 365 MG/DL (ref 70–108)
GLUCOSE BLD-MCNC: 58 MG/DL (ref 70–108)
HBA1C MFR BLD: 8.8 % (ref 4.4–6.4)
HCT VFR BLD CALC: 42.8 % (ref 42–52)
HEMOGLOBIN: 13.4 GM/DL (ref 14–18)
MAGNESIUM: 2.1 MG/DL (ref 1.6–2.4)
MCH RBC QN AUTO: 26.7 PG (ref 26–33)
MCHC RBC AUTO-ENTMCNC: 31.3 GM/DL (ref 32.2–35.5)
MCV RBC AUTO: 85.3 FL (ref 80–94)
OSMOLALITY CALCULATION: 275.1 MOSMOL/KG (ref 275–300)
PLATELET # BLD: 185 THOU/MM3 (ref 130–400)
PMV BLD AUTO: 10.6 FL (ref 9.4–12.4)
POTASSIUM SERPL-SCNC: 4.7 MEQ/L (ref 3.5–5.2)
RBC # BLD: 5.02 MILL/MM3 (ref 4.7–6.1)
SODIUM BLD-SCNC: 134 MEQ/L (ref 135–145)
WBC # BLD: 8.4 THOU/MM3 (ref 4.8–10.8)

## 2021-11-21 PROCEDURE — 80048 BASIC METABOLIC PNL TOTAL CA: CPT

## 2021-11-21 PROCEDURE — 36415 COLL VENOUS BLD VENIPUNCTURE: CPT

## 2021-11-21 PROCEDURE — 1200000003 HC TELEMETRY R&B

## 2021-11-21 PROCEDURE — 85027 COMPLETE CBC AUTOMATED: CPT

## 2021-11-21 PROCEDURE — 6360000002 HC RX W HCPCS: Performed by: INTERNAL MEDICINE

## 2021-11-21 PROCEDURE — 6370000000 HC RX 637 (ALT 250 FOR IP): Performed by: STUDENT IN AN ORGANIZED HEALTH CARE EDUCATION/TRAINING PROGRAM

## 2021-11-21 PROCEDURE — 93010 ELECTROCARDIOGRAM REPORT: CPT | Performed by: NUCLEAR MEDICINE

## 2021-11-21 PROCEDURE — 2580000003 HC RX 258: Performed by: INTERNAL MEDICINE

## 2021-11-21 PROCEDURE — 99233 SBSQ HOSP IP/OBS HIGH 50: CPT | Performed by: INTERNAL MEDICINE

## 2021-11-21 PROCEDURE — 83036 HEMOGLOBIN GLYCOSYLATED A1C: CPT

## 2021-11-21 PROCEDURE — 2580000003 HC RX 258: Performed by: STUDENT IN AN ORGANIZED HEALTH CARE EDUCATION/TRAINING PROGRAM

## 2021-11-21 PROCEDURE — 6360000002 HC RX W HCPCS: Performed by: STUDENT IN AN ORGANIZED HEALTH CARE EDUCATION/TRAINING PROGRAM

## 2021-11-21 PROCEDURE — 83735 ASSAY OF MAGNESIUM: CPT

## 2021-11-21 PROCEDURE — 86140 C-REACTIVE PROTEIN: CPT

## 2021-11-21 PROCEDURE — 82948 REAGENT STRIP/BLOOD GLUCOSE: CPT

## 2021-11-21 PROCEDURE — 6370000000 HC RX 637 (ALT 250 FOR IP): Performed by: INTERNAL MEDICINE

## 2021-11-21 RX ADMIN — ENOXAPARIN SODIUM 40 MG: 100 INJECTION SUBCUTANEOUS at 21:35

## 2021-11-21 RX ADMIN — Medication 15 G: at 08:24

## 2021-11-21 RX ADMIN — SODIUM CHLORIDE, PRESERVATIVE FREE 10 ML: 5 INJECTION INTRAVENOUS at 09:14

## 2021-11-21 RX ADMIN — INSULIN LISPRO 4 UNITS: 100 INJECTION, SOLUTION INTRAVENOUS; SUBCUTANEOUS at 18:30

## 2021-11-21 RX ADMIN — SODIUM CHLORIDE: 9 INJECTION, SOLUTION INTRAVENOUS at 13:29

## 2021-11-21 RX ADMIN — BARICITINIB 4 MG: 2 TABLET, FILM COATED ORAL at 09:14

## 2021-11-21 RX ADMIN — DEXAMETHASONE SODIUM PHOSPHATE 6 MG: 4 INJECTION, SOLUTION INTRA-ARTICULAR; INTRALESIONAL; INTRAMUSCULAR; INTRAVENOUS; SOFT TISSUE at 09:14

## 2021-11-21 RX ADMIN — SODIUM CHLORIDE: 9 INJECTION, SOLUTION INTRAVENOUS at 19:59

## 2021-11-21 RX ADMIN — AZITHROMYCIN DIHYDRATE 500 MG: 500 INJECTION, POWDER, LYOPHILIZED, FOR SOLUTION INTRAVENOUS at 21:36

## 2021-11-21 RX ADMIN — INSULIN LISPRO 3 UNITS: 100 INJECTION, SOLUTION INTRAVENOUS; SUBCUTANEOUS at 21:36

## 2021-11-21 RX ADMIN — SODIUM CHLORIDE: 9 INJECTION, SOLUTION INTRAVENOUS at 06:09

## 2021-11-21 RX ADMIN — INSULIN LISPRO 3 UNITS: 100 INJECTION, SOLUTION INTRAVENOUS; SUBCUTANEOUS at 13:27

## 2021-11-21 RX ADMIN — ATORVASTATIN CALCIUM 20 MG: 20 TABLET, FILM COATED ORAL at 20:02

## 2021-11-21 RX ADMIN — DEXTROSE MONOHYDRATE 100 ML/HR: 50 INJECTION, SOLUTION INTRAVENOUS at 08:33

## 2021-11-21 NOTE — ED NOTES
Patient resting on cart with eyes closed. Respirations regular and unlabored. Patient arouses easily to voice. Patient denies pain. Patient updated on plan of care. Patient ordered food tray for breakfast.  Call light in reach.      Kelley Sandoval RN  11/21/21 3876

## 2021-11-21 NOTE — ED NOTES
Pt moved to inpatient bed for comfort. VSS, RR is regular and unlabored. Pt is resting in bed, call light in reach.  Denies any needs at this time.           Carolina Bloch, RN  11/21/21 0541

## 2021-11-21 NOTE — ED NOTES
Pt resting in bed, ginger ale given. VSS, RR is regular and unlabored. Pt is resting in bed, call light in reach. Denies any needs at this time.       Jb Butler RN  11/21/21 6858

## 2021-11-21 NOTE — ED NOTES
ED nurse-to-nurse bedside report    Chief Complaint   Patient presents with    Fatigue    Diarrhea      LOC: alert and orientated to name, place, date  Vital signs   Vitals:    11/21/21 0014 11/21/21 0306 11/21/21 0520 11/21/21 0613   BP: 120/72 126/81     Pulse: 80 74 73 87   Resp: 18 18 18 18   Temp:       TempSrc:       SpO2: 96% 95% 94% 95%   Weight:       Height:          Pain:    Pain Interventions: none  Pain Goal: none  Oxygen: Yes    Current needs required 3L NC  Telemetry: Yes  LDAs:   Peripheral IV 11/20/21 Right Antecubital (Active)   Site Assessment Clean; Dry; Intact 11/20/21 1206   Line Status Infusing 11/20/21 1206   Dressing Status Clean; Intact; Dry 11/20/21 1206     Continuous Infusions:    sodium chloride      sodium chloride 150 mL/hr at 11/21/21 9752    dextrose       Mobility: Requires assistance * 1  Scott Fall Risk Score: No flowsheet data found. Fall Interventions: call light in reach, rails up x 2  Report given to:  Margot Ceja, MARVEL  11/21/21 5353       Juhi Ceja RN  11/21/21 5798

## 2021-11-21 NOTE — ED NOTES
Resting in bed watching tv. No concerns voiced at this time. Call light within reach. Side rails up x2.  Will monitor      Alysia Crockett RN  11/20/21 5721

## 2021-11-21 NOTE — ED NOTES
Report given. VSS, RR is regular and unlabored. Pt is resting in bed, call light in reach. Denies any needs at this time.       Janice Kline RN  11/20/21 8562

## 2021-11-21 NOTE — ED NOTES
ED to inpatient nurses report    Chief Complaint   Patient presents with    Fatigue    Diarrhea      Present to ED from home  LOC: alert and orientated to name, place, date  Vital signs   Vitals:    11/21/21 0306 11/21/21 0520 11/21/21 0613 11/21/21 0817   BP: 126/81   (!) 129/95   Pulse: 74 73 87 89   Resp: 18 18 18 24   Temp:       TempSrc:       SpO2: 95% 94% 95% 94%   Weight:       Height:          Oxygen Baseline 95%    Current needs required 3L NC Bipap/Cpap No  LDAs:   Peripheral IV 11/20/21 Right Antecubital (Active)   Site Assessment Clean; Dry; Intact 11/20/21 1206   Line Status Infusing 11/20/21 1206   Dressing Status Clean; Intact; Dry 11/20/21 1206     Mobility: Independent  Pending ED orders:  None   Present condition: Stable  Person of contract on chart.   Our promise was given to patient    Electronically signed by Keisha Haynes RN on 11/21/2021 at 9:30 AM       Sophie Heredia RN  11/21/21 5990

## 2021-11-21 NOTE — PROGRESS NOTES
Hospitalist Progress Note       Patient:  Zee Koroma. Unit/Bed:8B35/035-A    YOB: 1975    MRN: [id number]       Acct: [de-identified]     PCP: ARLEEN Schultz - NP    Date of Admission: 11/20/2021    Assessment/Plan:     #Acute hypoxic respiratory failure due to COVID-19 pneumonia: SIRS 3/4 (fever, tachycardia, tachypnea). Unvaccinated. Febrile (Tmax 101. 3) with cough, nausea, vomiting, diarrhea. Procal 0.18. CRP 10 Associated tachypnea intermittently. Will start on Decadron 6mg PO daily. Meets criteria for Baricitinib- pharmacy consulted. No indications for antibiotics at this time. Will give another 1L bolus and start on  cc/hr. Currently on 3L NC saturating 94%. Wean off as needed with SpO2 goal >90%. Not on oxygen at baseline. Current smoker. No other history of lung disease.   - 11/21: ON 3L NC w/O2 sats >90%     Acute Diarrhea a/w N/V likely 2/2 COVID-19 vs EAEC, likely combination. Has been going on for the last few days- unclear timeline. Anti-emetics prn. Patient given food in the ED and wants to eat so will give diet. Monitor. On IVF. PTOT. - 11/21: Molecular PCR panle demonstrated Enteroaggregative E Coli (EAEC), pending Cdiff testing. There has not been any recent exposure to antibiotics. Supportive care. Since he appears dehydrated and is receiving IV steroids for COVID, will treat his E Coli diarrhea to prevent progression, plan for 3 days of Azithromycin 500mg     Mild hypovolemic hyponatremia: Na 131 on admission. Likely due to acute viral pneumonia with diarrhea. On IVF. Trend daily. Currently at 134.      Primary HTN: controlled. On Norvasc, HCTZ, Lisinopril, Troprol-XL. Holding at this time. Resume as appropriate. Monitor vitals and BMP.      NIDDMII: HbA1c not on Epic. Will check it. On Metformin ER, Glipizide, and Actos at home- held inpatient. On high dose SSI. BG inpatient goal 140-180. Monitor and adjust as needed.      HLD: on statin.  Home meds include Lipitor 20 mg daily as well as Pravastatin 40 mg daily? Will do med reconciliation.      SAM: Pt states he lost his prior unit and unclear what setting were used. Last PSG was in 2011. Could initiate for sleep if needed. Expected discharge date:  TBD     Disposition:    [x] Home       [] TCU       [] Rehab       [] Psych       [] SNF       [] [place]       [] Other-    Chief Complaint: Fatigue a/w N/V/D    Hospital Course:     Per prior H&P, \"56 yo M current smoker with history of SAM, HTN, and T2DM presented to [facility] ED with complaints of acute generalized weakness, diarrhea, and dry cough x 1 week. Presented febrile 101.3 with associated tachycardia 105 and tachypnea 24. Saturating 90 on RA. Placed on 3L NC. Labs significant for Na 131, Ferritin 1361, CRP 10.44, COVID positive. Lactate normal. No leukocytosis. CXR showed new bilateral pulmonary opacities. CTA chest with no PE, revealed mild to moderate bilateral patchy pulmonary opacities consistent with infiltrates. Given 1L IVNS and Decadron 8 mg PO x1 dose in the ED. Admitted for acute hypoxic respiratory failure.      On evaluation, patient reports his symptoms of cough, fatigue started last weekend. Has had diarrhea, nausea, vomiting over the last few days. Could not go to work yesterday due to generalized weakness, nausea, vomiting. Smokes ~0.5 ppd x 20+ years. No alcohol or other drug use. Currently feeling tired, nauseated. Last emesis today. But wants to eat, feeling hungry. \"    Subjective (past 24 hours):        Medications:  Reviewed    Infusion Medications    sodium chloride      sodium chloride 150 mL/hr at 11/21/21 0609    dextrose Stopped (11/21/21 0923)     Scheduled Medications    insulin lispro  0-6 Units SubCUTAneous TID WC    insulin lispro  0-3 Units SubCUTAneous Nightly    sodium chloride flush  5-40 mL IntraVENous 2 times per day    enoxaparin  40 mg SubCUTAneous Q24H    dexamethasone  6 mg Oral Q24H    atorvastatin  20 mg Oral Daily    baricitinib  4 mg Oral Daily    azithromycin  500 mg IntraVENous Q24H     PRN Meds: sodium chloride flush, sodium chloride, ondansetron **OR** ondansetron, polyethylene glycol, acetaminophen **OR** acetaminophen, glucose, dextrose, glucagon (rDNA), dextrose      Intake/Output Summary (Last 24 hours) at 11/21/2021 7721  Last data filed at 11/21/2021 1890  Gross per 24 hour   Intake 1080.9 ml   Output --   Net 1080.9 ml       Diet:  ADULT DIET; Regular; 4 carb choices (60 gm/meal)    Exam:  BP (!) 129/95   Pulse 89   Temp 99.6 °F (37.6 °C)   Resp 24   Ht 6' 6\" (1.981 m)   Wt (!) 340 lb (154.2 kg)   SpO2 94%   BMI 39.29 kg/m²     General appearance: No apparent distress, appears stated age and cooperative. Appears acutely ill and dehydrated. HEENT: Normal cephalic, atraumatic without obvious deformity. Extra ocular muscles intact. Conjunctivae/corneas clear. Neck: Supple, with full range of motion. No jugular venous distention. Trachea midline. Respiratory:  Normal respiratory effort. Diminished lung sounds bilaterally without Rales/Wheezes/Rhonchi. Cardiovascular: Regular rate and rhythm with normal S1/S2 without murmurs, rubs or gallops. Abdomen: Soft, non-tender but sensitive to palpation, non-distended. Musculoskeletal:  No clubbing, cyanosis or edema bilaterally. Skin: Skin color, texture, turgor normal.  No rashes or lesions. Neurologic:  Neurovascularly intact without any focal sensory/motor deficits.  Cranial nerves: II-XII intact, grossly non-focal.  Psychiatric: Alert and oriented, thought content appropriate, normal insight  Capillary Refill: Brisk,< 3 seconds   Peripheral Pulses: +2 palpable, equal bilaterally      Labs:   Recent Labs     11/20/21  1211 11/21/21  0645   WBC 4.7* 8.4   HGB 14.6 13.4*   HCT 44.7 42.8    185     Recent Labs     11/20/21  1211 11/21/21  0645   * 134*   K 4.5 4.7   CL 91* 97*   CO2 28 28   BUN 17 17   CREATININE 1.2 1. 1   CALCIUM 8.7 8.6   PHOS 3.1  --      Recent Labs     11/20/21  1211   AST 30   ALT 42   BILITOT 0.3   ALKPHOS 57     No results for input(s): INR in the last 72 hours. No results for input(s): Adonica Hoose in the last 72 hours. Recent Labs     11/20/21  1211   PROCAL 0.18*       Microbiology:      Urinalysis:      Lab Results   Component Value Date    NITRU NEGATIVE 11/02/2014    WBCUA 0 11/02/2014    BACTERIA NONE 11/02/2014    RBCUA 0 11/02/2014    BLOODU TRACE 11/02/2014    SPECGRAV >1.030 11/02/2014       Radiology:  CTA CHEST W WO CONTRAST   Final Result       1. No pulmonary emboli. 2. Mild to moderate severity bilateral patchy pulmonary opacities consistent with infiltrates associated with Covid 19. **This report has been created using voice recognition software. It may contain minor errors which are inherent in voice recognition technology. **      Final report electronically signed by Dr. Russell Baumann on 11/20/2021 1:50 PM      XR CHEST PORTABLE   Final Result   New bilateral pulmonary opacities consistent with pulmonary infiltrates. **This report has been created using voice recognition software. It may contain minor errors which are inherent in voice recognition technology. **      Final report electronically signed by Dr. Russell Baumann on 11/20/2021 12:17 PM          DVT prophylaxis: [x] Lovenox                                 [] SCDs                                 [] SQ Heparin                                 [] Encourage ambulation           [] Already on Anticoagulation     Code Status: Full Code    Tele:   [x] yes             [] no    Active Hospital Problems    Diagnosis Date Noted    Pneumonia due to COVID-19 virus [U07.1, J12.82] 11/20/2021       Electronically signed by Brandt German MD on 11/21/2021 at 9:52 AM

## 2021-11-21 NOTE — ED NOTES
Pt restin gin bed. Voiced no concerns. Call light within reach.  Will monitor      Antonette Beard RN  11/20/21 1958

## 2021-11-22 LAB
ANION GAP SERPL CALCULATED.3IONS-SCNC: 12 MEQ/L (ref 8–16)
BUN BLDV-MCNC: 15 MG/DL (ref 7–22)
CALCIUM SERPL-MCNC: 8.4 MG/DL (ref 8.5–10.5)
CHLORIDE BLD-SCNC: 96 MEQ/L (ref 98–111)
CO2: 26 MEQ/L (ref 23–33)
CREAT SERPL-MCNC: 1.1 MG/DL (ref 0.4–1.2)
ERYTHROCYTE [DISTWIDTH] IN BLOOD BY AUTOMATED COUNT: 13.4 % (ref 11.5–14.5)
ERYTHROCYTE [DISTWIDTH] IN BLOOD BY AUTOMATED COUNT: 40.5 FL (ref 35–45)
GFR SERPL CREATININE-BSD FRML MDRD: 87 ML/MIN/1.73M2
GLUCOSE BLD-MCNC: 217 MG/DL (ref 70–108)
GLUCOSE BLD-MCNC: 218 MG/DL (ref 70–108)
GLUCOSE BLD-MCNC: 301 MG/DL (ref 70–108)
GLUCOSE BLD-MCNC: 345 MG/DL (ref 70–108)
GLUCOSE BLD-MCNC: 357 MG/DL (ref 70–108)
HCT VFR BLD CALC: 39.6 % (ref 42–52)
HEMOGLOBIN: 12.8 GM/DL (ref 14–18)
MCH RBC QN AUTO: 26.8 PG (ref 26–33)
MCHC RBC AUTO-ENTMCNC: 32.3 GM/DL (ref 32.2–35.5)
MCV RBC AUTO: 83 FL (ref 80–94)
PLATELET # BLD: 229 THOU/MM3 (ref 130–400)
PMV BLD AUTO: 10.2 FL (ref 9.4–12.4)
POTASSIUM SERPL-SCNC: 4.8 MEQ/L (ref 3.5–5.2)
RBC # BLD: 4.77 MILL/MM3 (ref 4.7–6.1)
SODIUM BLD-SCNC: 134 MEQ/L (ref 135–145)
WBC # BLD: 11 THOU/MM3 (ref 4.8–10.8)

## 2021-11-22 PROCEDURE — 97530 THERAPEUTIC ACTIVITIES: CPT

## 2021-11-22 PROCEDURE — 97162 PT EVAL MOD COMPLEX 30 MIN: CPT

## 2021-11-22 PROCEDURE — 82948 REAGENT STRIP/BLOOD GLUCOSE: CPT

## 2021-11-22 PROCEDURE — 85027 COMPLETE CBC AUTOMATED: CPT

## 2021-11-22 PROCEDURE — 2580000003 HC RX 258: Performed by: INTERNAL MEDICINE

## 2021-11-22 PROCEDURE — 6360000002 HC RX W HCPCS: Performed by: STUDENT IN AN ORGANIZED HEALTH CARE EDUCATION/TRAINING PROGRAM

## 2021-11-22 PROCEDURE — 2580000003 HC RX 258: Performed by: STUDENT IN AN ORGANIZED HEALTH CARE EDUCATION/TRAINING PROGRAM

## 2021-11-22 PROCEDURE — 99233 SBSQ HOSP IP/OBS HIGH 50: CPT | Performed by: INTERNAL MEDICINE

## 2021-11-22 PROCEDURE — 1200000003 HC TELEMETRY R&B

## 2021-11-22 PROCEDURE — 36415 COLL VENOUS BLD VENIPUNCTURE: CPT

## 2021-11-22 PROCEDURE — 80048 BASIC METABOLIC PNL TOTAL CA: CPT

## 2021-11-22 PROCEDURE — 6370000000 HC RX 637 (ALT 250 FOR IP): Performed by: INTERNAL MEDICINE

## 2021-11-22 PROCEDURE — 97166 OT EVAL MOD COMPLEX 45 MIN: CPT

## 2021-11-22 PROCEDURE — 6360000002 HC RX W HCPCS: Performed by: INTERNAL MEDICINE

## 2021-11-22 PROCEDURE — 6370000000 HC RX 637 (ALT 250 FOR IP): Performed by: STUDENT IN AN ORGANIZED HEALTH CARE EDUCATION/TRAINING PROGRAM

## 2021-11-22 RX ORDER — IPRATROPIUM BROMIDE AND ALBUTEROL SULFATE 2.5; .5 MG/3ML; MG/3ML
1 SOLUTION RESPIRATORY (INHALATION) EVERY 4 HOURS PRN
Status: DISCONTINUED | OUTPATIENT
Start: 2021-11-22 | End: 2021-11-23 | Stop reason: HOSPADM

## 2021-11-22 RX ADMIN — INSULIN LISPRO 4 UNITS: 100 INJECTION, SOLUTION INTRAVENOUS; SUBCUTANEOUS at 13:20

## 2021-11-22 RX ADMIN — DEXAMETHASONE SODIUM PHOSPHATE 6 MG: 4 INJECTION, SOLUTION INTRA-ARTICULAR; INTRALESIONAL; INTRAMUSCULAR; INTRAVENOUS; SOFT TISSUE at 08:19

## 2021-11-22 RX ADMIN — ATORVASTATIN CALCIUM 20 MG: 20 TABLET, FILM COATED ORAL at 21:06

## 2021-11-22 RX ADMIN — INSULIN LISPRO 2 UNITS: 100 INJECTION, SOLUTION INTRAVENOUS; SUBCUTANEOUS at 09:15

## 2021-11-22 RX ADMIN — AZITHROMYCIN DIHYDRATE 500 MG: 500 INJECTION, POWDER, LYOPHILIZED, FOR SOLUTION INTRAVENOUS at 23:44

## 2021-11-22 RX ADMIN — ENOXAPARIN SODIUM 40 MG: 100 INJECTION SUBCUTANEOUS at 21:06

## 2021-11-22 RX ADMIN — SODIUM CHLORIDE: 9 INJECTION, SOLUTION INTRAVENOUS at 14:39

## 2021-11-22 RX ADMIN — SODIUM CHLORIDE, PRESERVATIVE FREE 10 ML: 5 INJECTION INTRAVENOUS at 08:20

## 2021-11-22 RX ADMIN — INSULIN LISPRO 5 UNITS: 100 INJECTION, SOLUTION INTRAVENOUS; SUBCUTANEOUS at 17:02

## 2021-11-22 RX ADMIN — BARICITINIB 4 MG: 2 TABLET, FILM COATED ORAL at 08:19

## 2021-11-22 RX ADMIN — INSULIN LISPRO 2 UNITS: 100 INJECTION, SOLUTION INTRAVENOUS; SUBCUTANEOUS at 21:06

## 2021-11-22 ASSESSMENT — PAIN SCALES - GENERAL
PAINLEVEL_OUTOF10: 0

## 2021-11-22 NOTE — PROGRESS NOTES
[facility]  INPATIENT OCCUPATIONAL THERAPY  [facility] MED SURG 8B  EVALUATION    Time:    Time In: 2496  Time Out: 6383  Timed Code Treatment Minutes: 15 Minutes  Minutes: 24          Date: 2021  Patient Name: Victor M Camacho,   Gender: male      MRN: [id number]  : 1975  (55 y.o.)  Referring Practitioner: Mar Coyle MD  Diagnosis: Pneumonia due to COVID-19 virus  Additional Pertinent Hx: Per prior H&P, \"54 yo M current smoker with history of SAM, HTN, and T2DM presented to [facility] ED with complaints of acute generalized weakness, diarrhea, and dry cough x 1 week. Presented febrile 101.3 with associated tachycardia 105 and tachypnea 24. Saturating 90 on RA. Placed on 3L NC. Labs significant for Na 131, Ferritin 1361, CRP 10.44, COVID positive. Lactate normal. No leukocytosis. CXR showed new bilateral pulmonary opacities. CTA chest with no PE, revealed mild to moderate bilateral patchy pulmonary opacities consistent with infiltrates. Given 1L IVNS and Decadron 8 mg PO x1 dose in the ED. Admitted for acute hypoxic respiratory failure. Restrictions/Precautions:  Restrictions/Precautions: General Precautions, Fall Risk    Subjective  Chart Reviewed: Yes, Progress Notes, Orders  Patient assessed for rehabilitation services?: Yes  Family / Caregiver Present: No    Subjective: RN okayed session. Pt was up in chair upon arrival.    Pain:  Pain Assessment  Patient Currently in Pain: Denies    Vitals: Oxygen: Pt on 4L O2, resting sats at 94%, dropping to 91-92% with activity.     Social/Functional History:  Lives With: Other (comment)  Type of Home: House  Home Layout: Two level, Bed/Bath upstairs  Home Equipment:  (None)           ADL Assistance: Independent  Homemaking Assistance: Independent  Homemaking Responsibilities: Yes  Ambulation Assistance: Independent  Transfer Assistance: Independent    Active : Yes          VISION:WFL    HEARING:  WFL    COGNITION: WFL    RANGE OF MOTION:  Bilateral Upper Extremity:  WFL    STRENGTH:  Bilateral Upper Extremity:  WFL    SENSATION:   WFL    ADL:   Lower Extremity Dressing: Modified Independent. Vearl Pippins BALANCE:  Sitting Balance:  Independent. Standing Balance: Supervision. BED MOBILITY:  Not Tested    TRANSFERS:  Sit to Stand:  Modified Independent. Stand to Sit: Modified Independent. FUNCTIONAL MOBILITY:  Assistive Device: None  Assist Level:  Supervision. Distance: multiple laps within room  Pt completed at fair pace, steady throughout. Min SOB noted. Activity Tolerance:  Patient tolerance of  treatment: good. Assessment:  Assessment: Pt presented with the listed deficits s/p admission with pneumonia due to COVID-19 virus. Pt with decreased strength, endurance, and activity tolerance and currently requires increased A for ADLs and IADLs. Pt would benefit from continued OT to restore PLOF maximize pt's indep with ADLs and IADLs in prep for a safe return home at max level of indep. Performance deficits / Impairments: Decreased functional mobility , Decreased endurance, Decreased ADL status, Decreased high-level IADLs  Prognosis: Good  REQUIRES OT FOLLOW UP: Yes  Decision Making: Medium Complexity  Safety Devices in place: Yes  Type of devices: All fall risk precautions in place    Treatment Initiated: Treatment and education initiated within context of evaluation. Evaluation time included review of current medical information, gathering information related to past medical, social and functional history, completion of standardized testing, formal and informal observation of tasks, assessment of data and development of plan of care and goals. Treatment time included skilled education and facilitation of tasks to increase safety and independence with ADL's for improved functional independence and quality of life.     Discharge Recommendations:  Continue to assess pending progress    Patient Education:  OT Education: OT Role, Plan of Care, ADL Adaptive Strategies, Transfer Training, Energy Conservation    Equipment Recommendations:  Equipment Needed: No    Plan:  Times per week: 3-5x  Times per day: Daily  Current Treatment Recommendations: Strengthening, Balance Training, Safety Education & Training, Self-Care / ADL, Endurance Training, Patient/Caregiver Education & Training, Functional Mobility Training. See long-term goal time frame for expected duration of plan of care. If no long-term goals established, a short length of stay is anticipated. Goals:  Patient goals : return mojgan  Short term goals  Time Frame for Short term goals: by discharge  Short term goal 1: Pt will complete functional mobility HH distances with Mod I while maintaining O2 >90% for ease with ADLs  Short term goal 2: Pt will tolerate dynamic standing x8 min with Mod I and B hand release for ease with grooming  Short term goal 3: Pt will complete BUE therex with heavy resistance and min VC for technique for ease with ADLs  Short term goal 4: Pt will utilize ECT during BADL routine with Mod I and min VC for ease with bathing tasks. Following session, patient left in safe position with all fall risk precautions in place.

## 2021-11-22 NOTE — CARE COORDINATION
11/22/21, 10:59 AM EST  DISCHARGE PLANNING EVALUATION:    Gamal Ji. Admitted: 11/20/2021/ [facility] day: 2   Location: [address] [place] [address]-A Reason for admit: Pneumonia due to COVID-19 virus [U07.1, J12.82]  COVID-19 [U07.1]   PMH:  has a past medical history of Heartburn, Hyperlipidemia, Hypertension, Restless legs syndrome, Sleep apnea, SOB (shortness of breath), and Type II or unspecified type diabetes mellitus without mention of complication, not stated as uncontrolled. Procedure:   11/20 CXR: New bilateral pulmonary opacities consistent with pulmonary infiltrates. 11/20 CTA chest:   1. No pulmonary emboli. 2. Mild to moderate severity bilateral patchy pulmonary opacities consistent with infiltrates associated with Covid 19. Barriers to Discharge:  Presented to ED with sob. + covid. Hospitalist following. Requiring 4L NC . IVF. I V zithromax. Baricitinib. IV decadron. PCP: ARLEEN Gomez NP  Readmission Risk Score: 8.4 ( )%    Patient Goals/Plan/Treatment Preferences: Spoke with Melissa Lovett he plans to return home at discharge. He is independent. He and his uncle live together. He works outside of home and denies discharge needs. SR DME if home O2 is needed. Per patient request, faxed date of admission and end date of quarantine to his HR dept. Consent to release information obtained prior and placed in chart. Transportation/Food Security/Housekeeping Addressed:  No issues identified.

## 2021-11-22 NOTE — PROGRESS NOTES
Physician Progress Note      PATIENT:               Kareem Wells  CSN #:                  [id number]  :                       1975  ADMIT DATE:       2021 11:39 AM  DISCH DATE:  RESPONDING  PROVIDER #:        Florentin SIMPSON DO          QUERY TEXT:    Pt admitted with COVID-19 and noted to have fever, tachycardia, and tachypnea. If possible, please document in progress notes and discharge summary if you   are evaluating and/or treating: The medical record reflects the following:  Risk Factors: COVID-19  Clinical Indicators: WBC 4.7, PCT 0.18, lactic 1.1, CRP 10.44, temp 101.3, HR   105, RR 24 with acute hypoxic respiratory failure  Treatment: labs, imaging, Baricitnib, IVF, supplemental oxygen  Options provided:  -- Sepsis present on admission due to COVID-19 infection  -- Sepsis not present on admission due to COVID-19 infection  -- Sepsis present on admission due to COVID-19 pneumonia  -- Sepsis not present on admission due to COVID-19 pneumonia  -- Covid-19 infection without sepsis  -- Covid-19 pneumonia without sepsis  -- Other - I will add my own diagnosis  -- Disagree - Not applicable / Not valid  -- Disagree - Clinically unable to determine / Unknown  -- Refer to Clinical Documentation Reviewer    PROVIDER RESPONSE TEXT:    This patient has sepsis which was present on admission due to COVID-19   infection.     Query created by: Lara Deluca on 2021 7:26 AM      Electronically signed by:  Terra Felipe DO 2021 11:47 AM

## 2021-11-22 NOTE — PROGRESS NOTES
[facility]  INPATIENT PHYSICAL THERAPY  EVALUATION  [facility] MED SURG 8B - 8B-35/035-A    Time In: 3716  Time Out: 2297  Timed Code Treatment Minutes: 10 Minutes  Minutes: 20          Date: 2021  Patient Name: Sonia Zhong,  Gender:  male        MRN: [id number]  : 1975  (55 y.o.)      Referring Practitioner: Bryan Wong MD  Diagnosis: Pneumonia due to COVID-19 virus  Additional Pertinent Hx: 54 yo M current smoker with history of SAM, HTN, and T2DM presented to [address] [place] [address] ED with complaints of acute generalized weakness, diarrhea, and dry cough x 1 week. Presented febrile 101.3 with associated tachycardia 105 and tachypnea 24. Saturating 90 on RA. Placed on 3L NC. Labs significant for Na 131, Ferritin 1361, CRP 10.44, COVID positive. Lactate normal. No leukocytosis. CXR showed new bilateral pulmonary opacities. CTA chest with no PE, revealed mild to moderate bilateral patchy pulmonary opacities consistent with infiltrates. Given 1L IVNS and Decadron 8 mg PO x1 dose in the ED. Admitted for acute hypoxic respiratory failure. Restrictions/Precautions:  Restrictions/Precautions: General Precautions, Fall Risk  Position Activity Restriction  Other position/activity restrictions: Droplet+    Subjective:  Patient assessed for rehabilitation services?: Yes  Family / Caregiver Present: No  Subjective: RN approved session, pt is seated in recliner, agreeable to PT.     General:  Overall Orientation Status: Within Functional Limits  Follows Commands: Within Functional Limits    Vision: Within Functional Limits    Hearing: Within functional limits         Pain: denies    Vitals: Oxygen: 92% at rest on 4 L O2, 93-94% during ambulation    Social/Functional History:    Lives With: Other (comment)  Type of Home: House  Home Layout: Two level, Bed/Bath upstairs  Home Equipment:  (None)   ADL Assistance: Independent  Homemaking Assistance: Independent  Homemaking Responsibilities: Yes  Ambulation Assistance: Independent  Transfer Assistance: Independent    Active : Yes          OBJECTIVE:    Balance:  Static Standing Balance: Stand By Assistance  Dynamic Standing Balance: Stand By Assistance    Bed Mobility:  Not Tested    Transfers:  Sit to Stand: Stand By Assistance  Stand to Sit:Stand By Assistance    Ambulation:  Stand By Assistance  Distance: 300 feet  Surface: Level Tile  Device:No Device  Gait Deviations:  Slow Kenyatta, Decreased Step Length Bilaterally, Mild Path Deviations and Decreased Gait Speed  **Assist to manage O2 tank    Functional Outcome Measures: Not completed     ASSESSMENT:  Activity Tolerance:  Patient tolerance of  treatment: good. Treatment Initiated: Treatment and education initiated within context of evaluation. Evaluation time included review of current medical information, gathering information related to past medical, social and functional history, completion of standardized testing, formal and informal observation of tasks, assessment of data and development of plan of care and goals. Treatment time included skilled education and facilitation of tasks to increase safety and independence with functional mobility for improved independence and quality of life. Assessment: Body structures, Functions, Activity limitations: Decreased functional mobility , Decreased endurance, Decreased strength  Assessment: Pt tolerates session well, education on walking in the hallway with staff at least 3x/day and education on use of spirometer (none in the pt's room or on the unit, RN to obtain). PT to continue to progress strength and functional mobility.   Prognosis: Excellent    REQUIRES PT FOLLOW UP: Yes    Discharge Recommendations:  Discharge Recommendations: Home with assist PRN    Patient Education:  PT Education: PT Role, Plan of Care    Equipment Recommendations:  Equipment Needed: No    Plan:  Times per week: 1-2x C  Current Treatment Recommendations: Endurance Training, Functional Mobility Training, Transfer Training, Gait Training, Stair training, Patient/Caregiver Education & Training, Safety Education & Training    Goals:  Patient goals : to go home  Short term goals  Time Frame for Short term goals: by discharge  Short term goal 1: Pt to transfer sit <--> stand mod I for increased functional mobility. Short term goal 2: Pt to ambulate >500 feet without AD with Supervision while managing O2 tank for home access. Short term goal 3: Pt to ascend/descend 15 steps with HR SBA for bedroom/bathroom access. Long term goals  Time Frame for Long term goals : NA due to short length of stay. Following session, patient left in safe position with all fall risk precautions in place.

## 2021-11-22 NOTE — FLOWSHEET NOTE
11/22/21 8668   Encounter Summary   Services provided to: Patient   Continue Visiting Yes  (11/22 F-ph)   Complexity of Encounter Low   Length of Encounter 15 minutes   Spiritual/[facility]   Type Spiritual support   I contacted the patients family via phone conversation. I provided prayer, emotional support and words of comfort. I also wanted to see if the patients family had any additional spiritual needs. There were none at this time. The pt was admitted because of pneumonia due to COVID-19.

## 2021-11-23 VITALS
TEMPERATURE: 98.4 F | BODY MASS INDEX: 36.45 KG/M2 | WEIGHT: 315 LBS | HEIGHT: 78 IN | RESPIRATION RATE: 18 BRPM | HEART RATE: 56 BPM | DIASTOLIC BLOOD PRESSURE: 78 MMHG | SYSTOLIC BLOOD PRESSURE: 128 MMHG | OXYGEN SATURATION: 92 %

## 2021-11-23 LAB
ANION GAP SERPL CALCULATED.3IONS-SCNC: 12 MEQ/L (ref 8–16)
BUN BLDV-MCNC: 23 MG/DL (ref 7–22)
CALCIUM SERPL-MCNC: 9 MG/DL (ref 8.5–10.5)
CHLORIDE BLD-SCNC: 96 MEQ/L (ref 98–111)
CO2: 24 MEQ/L (ref 23–33)
CREAT SERPL-MCNC: 1 MG/DL (ref 0.4–1.2)
CULTURE, STOOL: NORMAL
ERYTHROCYTE [DISTWIDTH] IN BLOOD BY AUTOMATED COUNT: 13.3 % (ref 11.5–14.5)
ERYTHROCYTE [DISTWIDTH] IN BLOOD BY AUTOMATED COUNT: 40.9 FL (ref 35–45)
GFR SERPL CREATININE-BSD FRML MDRD: > 90 ML/MIN/1.73M2
GLUCOSE BLD-MCNC: 251 MG/DL (ref 70–108)
GLUCOSE BLD-MCNC: 270 MG/DL (ref 70–108)
HCT VFR BLD CALC: 42.2 % (ref 42–52)
HEMOGLOBIN: 13.5 GM/DL (ref 14–18)
MCH RBC QN AUTO: 27.1 PG (ref 26–33)
MCHC RBC AUTO-ENTMCNC: 32 GM/DL (ref 32.2–35.5)
MCV RBC AUTO: 84.6 FL (ref 80–94)
PLATELET # BLD: 275 THOU/MM3 (ref 130–400)
PMV BLD AUTO: 10.5 FL (ref 9.4–12.4)
POTASSIUM SERPL-SCNC: 4.7 MEQ/L (ref 3.5–5.2)
RBC # BLD: 4.99 MILL/MM3 (ref 4.7–6.1)
SODIUM BLD-SCNC: 132 MEQ/L (ref 135–145)
WBC # BLD: 10.5 THOU/MM3 (ref 4.8–10.8)

## 2021-11-23 PROCEDURE — 80048 BASIC METABOLIC PNL TOTAL CA: CPT

## 2021-11-23 PROCEDURE — 6370000000 HC RX 637 (ALT 250 FOR IP): Performed by: INTERNAL MEDICINE

## 2021-11-23 PROCEDURE — 94761 N-INVAS EAR/PLS OXIMETRY MLT: CPT

## 2021-11-23 PROCEDURE — 2580000003 HC RX 258: Performed by: STUDENT IN AN ORGANIZED HEALTH CARE EDUCATION/TRAINING PROGRAM

## 2021-11-23 PROCEDURE — 36415 COLL VENOUS BLD VENIPUNCTURE: CPT

## 2021-11-23 PROCEDURE — 6360000002 HC RX W HCPCS: Performed by: STUDENT IN AN ORGANIZED HEALTH CARE EDUCATION/TRAINING PROGRAM

## 2021-11-23 PROCEDURE — 99239 HOSP IP/OBS DSCHRG MGMT >30: CPT | Performed by: INTERNAL MEDICINE

## 2021-11-23 PROCEDURE — 85027 COMPLETE CBC AUTOMATED: CPT

## 2021-11-23 PROCEDURE — 82948 REAGENT STRIP/BLOOD GLUCOSE: CPT

## 2021-11-23 RX ORDER — METOPROLOL SUCCINATE 50 MG/1
50 TABLET, EXTENDED RELEASE ORAL DAILY
Status: DISCONTINUED | OUTPATIENT
Start: 2021-11-23 | End: 2021-11-23 | Stop reason: HOSPADM

## 2021-11-23 RX ORDER — DEXAMETHASONE 1 MG
1 TABLET ORAL 2 TIMES DAILY WITH MEALS
Qty: 4 TABLET | Refills: 0 | Status: SHIPPED | OUTPATIENT
Start: 2021-11-23 | End: 2021-11-25

## 2021-11-23 RX ORDER — PIOGLITAZONEHYDROCHLORIDE 45 MG/1
45 TABLET ORAL
Status: DISCONTINUED | OUTPATIENT
Start: 2021-11-23 | End: 2021-11-23 | Stop reason: HOSPADM

## 2021-11-23 RX ORDER — METFORMIN HYDROCHLORIDE 500 MG/1
1000 TABLET, EXTENDED RELEASE ORAL 2 TIMES DAILY WITH MEALS
Status: DISCONTINUED | OUTPATIENT
Start: 2021-11-23 | End: 2021-11-23 | Stop reason: HOSPADM

## 2021-11-23 RX ORDER — GLIPIZIDE 10 MG/1
10 TABLET ORAL
Status: DISCONTINUED | OUTPATIENT
Start: 2021-11-23 | End: 2021-11-23 | Stop reason: HOSPADM

## 2021-11-23 RX ADMIN — METOPROLOL SUCCINATE 50 MG: 50 TABLET, EXTENDED RELEASE ORAL at 08:24

## 2021-11-23 RX ADMIN — DEXAMETHASONE SODIUM PHOSPHATE 6 MG: 4 INJECTION, SOLUTION INTRA-ARTICULAR; INTRALESIONAL; INTRAMUSCULAR; INTRAVENOUS; SOFT TISSUE at 08:24

## 2021-11-23 RX ADMIN — INSULIN LISPRO 3 UNITS: 100 INJECTION, SOLUTION INTRAVENOUS; SUBCUTANEOUS at 09:04

## 2021-11-23 RX ADMIN — GLIPIZIDE 10 MG: 10 TABLET ORAL at 08:23

## 2021-11-23 RX ADMIN — BARICITINIB 4 MG: 2 TABLET, FILM COATED ORAL at 08:23

## 2021-11-23 RX ADMIN — PIOGLITAZONE 45 MG: 45 TABLET ORAL at 08:23

## 2021-11-23 RX ADMIN — METFORMIN HYDROCHLORIDE 1000 MG: 500 TABLET, EXTENDED RELEASE ORAL at 08:23

## 2021-11-23 RX ADMIN — SODIUM CHLORIDE, PRESERVATIVE FREE 10 ML: 5 INJECTION INTRAVENOUS at 08:24

## 2021-11-23 ASSESSMENT — PAIN SCALES - GENERAL
PAINLEVEL_OUTOF10: 0

## 2021-11-23 NOTE — CARE COORDINATION
11/23/21, 11:24 AM EST    Patient goals/plan/ treatment preferences discussed by  and . Patient goals/plan/ treatment preferences reviewed with patient/ family. Patient/ family verbalize understanding of discharge plan and are in agreement with goal/plan/treatment preferences. Understanding was demonstrated using the teach back method. AVS provided by RN at time of discharge, which includes all necessary medical information pertaining to the patients current course of illness, treatment, post-discharge goals of care, and treatment preferences. Discharging home with family. Denied need for [facility]. Did qualify for home O2, preferred SR DME, referral given to Ana [facility]. Will deliver once order is placed. Update: no home O2 needed, drop with activity was not substantial. DME aware no home O2 will be ordered.

## 2021-11-23 NOTE — PROGRESS NOTES
Discharge teaching and instructions for diagnosis/procedure of COVID completed with patient using teachback method. AVS reviewed. Printed prescriptions given to patient. Patient voiced understanding regarding prescriptions, follow up appointments, and care of self at home. Discharged ambulatory and in a wheelchair to  home with support per self. Work excuse given to patient along with information regarding incentive spirometer, portable pulse oximeter, and new medications. Questions answered and concerns addressed.

## 2021-11-23 NOTE — PROGRESS NOTES
Hospitalist Progress Note       Patient:  Josselyn Rivas. Unit/Bed:8B-35/035-A    YOB: 1975    MRN: [id number]       Acct: [de-identified]     PCP: ARLEEN Andrew NP    Date of Admission: 11/20/2021    Assessment/Plan:     #Acute hypoxic respiratory failure due to COVID-19 pneumonia: SIRS 3/4 (fever, tachycardia, tachypnea). Unvaccinated. Febrile (Tmax 101. 3) with cough, nausea, vomiting, diarrhea. Procal 0.18. CRP 10 Associated tachypnea intermittently. Will start on Decadron 6mg PO daily. Meets criteria for Baricitinib- pharmacy consulted. No indications for antibiotics at this time. Will give another 1L bolus and start on  cc/hr. Currently on 3L NC saturating 94%. Wean off as needed with SpO2 goal >90%. Not on oxygen at baseline. Current smoker. No other history of lung disease.   - 11/21: ON 3L NC w/O2 sats >90%  - 11/22: Still hypoxic, decreased fluids now that pt is able to tolerate good PO intake. Added Incentive spirometry, acapella, DuoNebs prn (schedule if ongoing oxygen issues)    #Sepsis 2/2 covid 19 pneumonia (POA, resolved): tachycardia, fevers. Did not receive full 30cc/kg in ED (2700 cc based on IBW), so was given an additional 1L bolus to complete this upon admission. His lactic was wnl.     Acute Diarrhea a/w N/V likely 2/2 COVID-19 vs EAEC, likely combination. Has been going on for the last few days- unclear timeline. Anti-emetics prn. Patient given food in the ED and wants to eat so will give diet. Monitor. On IVF. PTOT. - 11/21: Molecular PCR panle demonstrated Enteroaggregative E Coli (EAEC), pending Cdiff testing. There has not been any recent exposure to antibiotics. Supportive care. Since he appears dehydrated and is receiving IV steroids for COVID, will treat his E Coli diarrhea to prevent progression, plan for 3 days of Azithromycin 500mg     Mild hypovolemic hyponatremia: Na 131 on admission. Likely due to acute viral pneumonia with diarrhea. On IVF. Trend daily. Currently at 134.      Primary HTN: controlled. On Norvasc, HCTZ, Lisinopril, Troprol-XL. Holding at this time. Resume as appropriate. Monitor vitals and BMP.      NIDDMII: HbA1c not on Epic. Will check it. On Metformin ER, Glipizide, and Actos at home- held inpatient. On high dose SSI. BG inpatient goal 140-180. Monitor and adjust as needed.      HLD: on statin. Home meds include Lipitor 20 mg daily as well as Pravastatin 40 mg daily? Will do med reconciliation.      SAM: Pt states he lost his prior unit and unclear what setting were used. Last PSG was in 2011. Could initiate for sleep if needed. Expected discharge date:  TBD     Disposition:    [x] Home       [] TCU       [] Rehab       [] Psych       [] SNF       [] [place]       [] Other-    Chief Complaint: Fatigue a/w N/V/D    Hospital Course:     Per prior H&P, \"56 yo M current smoker with history of SAM, HTN, and T2DM presented to [facility] ED with complaints of acute generalized weakness, diarrhea, and dry cough x 1 week. Presented febrile 101.3 with associated tachycardia 105 and tachypnea 24. Saturating 90 on RA. Placed on 3L NC. Labs significant for Na 131, Ferritin 1361, CRP 10.44, COVID positive. Lactate normal. No leukocytosis. CXR showed new bilateral pulmonary opacities. CTA chest with no PE, revealed mild to moderate bilateral patchy pulmonary opacities consistent with infiltrates. Given 1L IVNS and Decadron 8 mg PO x1 dose in the ED. Admitted for acute hypoxic respiratory failure.      On evaluation, patient reports his symptoms of cough, fatigue started last weekend. Has had diarrhea, nausea, vomiting over the last few days. Could not go to work yesterday due to generalized weakness, nausea, vomiting. Smokes ~0.5 ppd x 20+ years. No alcohol or other drug use. Currently feeling tired, nauseated. Last emesis today. But wants to eat, feeling hungry. \"    11/22:  On 4LNC, O2 sats >90, tachypneic at times, otherwise VSS, afebrile. Spikes in pt BG likely 2/2 to infection/inflammation leading to increased stress response + steroids. Only on SSI, will adjust regimen tomorrow slowly so that when pt off steroids and infection clears, he will not have a rebound drop in BG. Pt wishes to update his family on matters himself. Subjective (past 24 hours):      Pt seen and evaluated at bedside in no acute distress. Denies CP, SOB, abdominal pain, N/V, fever/chills. Medications:  Reviewed    Infusion Medications    sodium chloride      sodium chloride 50 mL/hr at 11/22/21 1439    dextrose Stopped (11/21/21 8245)     Scheduled Medications    insulin lispro  0-6 Units SubCUTAneous TID WC    insulin lispro  0-3 Units SubCUTAneous Nightly    sodium chloride flush  5-40 mL IntraVENous 2 times per day    enoxaparin  40 mg SubCUTAneous Q24H    dexamethasone  6 mg Oral Q24H    atorvastatin  20 mg Oral Daily    baricitinib  4 mg Oral Daily    azithromycin  500 mg IntraVENous Q24H     PRN Meds: ipratropium-albuterol, sodium chloride flush, sodium chloride, ondansetron **OR** ondansetron, polyethylene glycol, acetaminophen **OR** acetaminophen, glucose, dextrose, glucagon (rDNA), dextrose      Intake/Output Summary (Last 24 hours) at 11/22/2021 2230  Last data filed at 11/22/2021 2100  Gross per 24 hour   Intake 5675.33 ml   Output --   Net 5675.33 ml       Diet:  ADULT DIET; Regular; 4 carb choices (60 gm/meal)    Exam:  /79   Pulse 68   Temp 98.3 °F (36.8 °C) (Oral)   Resp 20   Ht 6' 6\" (1.981 m)   Wt (!) 340 lb (154.2 kg)   SpO2 92%   BMI 39.29 kg/m²     General appearance: No apparent distress, appears stated age and cooperative. Appears acutely ill and dehydrated. HEENT: Normal cephalic, atraumatic without obvious deformity. Extra ocular muscles intact. Conjunctivae/corneas clear. Neck: Supple, with full range of motion. No jugular venous distention. Trachea midline. Respiratory:  Normal respiratory effort. Diminished lung sounds bilaterally without Rales/Wheezes/Rhonchi. Cardiovascular: Regular rate and rhythm with normal S1/S2 without murmurs, rubs or gallops. Abdomen: Soft, non-tender but sensitive to palpation, non-distended. Musculoskeletal:  No clubbing, cyanosis or edema bilaterally. Skin: Skin color, texture, turgor normal.  No rashes or lesions. Neurologic:  Neurovascularly intact without any focal sensory/motor deficits. Cranial nerves: II-XII intact, grossly non-focal.  Psychiatric: Alert and oriented, thought content appropriate, normal insight  Capillary Refill: Brisk,< 3 seconds   Peripheral Pulses: +2 palpable, equal bilaterally      Labs:   Recent Labs     11/20/21  1211 11/21/21  0645 11/22/21  0742   WBC 4.7* 8.4 11.0*   HGB 14.6 13.4* 12.8*   HCT 44.7 42.8 39.6*    185 229     Recent Labs     11/20/21  1211 11/21/21  0645 11/22/21  0742   * 134* 134*   K 4.5 4.7 4.8   CL 91* 97* 96*   CO2 28 28 26   BUN 17 17 15   CREATININE 1.2 1.1 1.1   CALCIUM 8.7 8.6 8.4*   PHOS 3.1  --   --      Recent Labs     11/20/21  1211   AST 30   ALT 42   BILITOT 0.3   ALKPHOS 57     No results for input(s): INR in the last 72 hours. No results for input(s): Adonica Hoose in the last 72 hours. Recent Labs     11/20/21  1211   PROCAL 0.18*       Microbiology:      Urinalysis:      Lab Results   Component Value Date    NITRU NEGATIVE 11/02/2014    WBCUA 0 11/02/2014    BACTERIA NONE 11/02/2014    RBCUA 0 11/02/2014    BLOODU TRACE 11/02/2014    SPECGRAV >1.030 11/02/2014       Radiology:  CTA CHEST W WO CONTRAST   Final Result       1. No pulmonary emboli. 2. Mild to moderate severity bilateral patchy pulmonary opacities consistent with infiltrates associated with Covid 19. **This report has been created using voice recognition software. It may contain minor errors which are inherent in voice recognition technology. **      Final report electronically signed by Dr. Russell Baumann on 11/20/2021 1:50 PM      XR CHEST PORTABLE   Final Result   New bilateral pulmonary opacities consistent with pulmonary infiltrates. **This report has been created using voice recognition software. It may contain minor errors which are inherent in voice recognition technology. **      Final report electronically signed by Dr. Bernice Beasley on 11/20/2021 12:17 PM          DVT prophylaxis: [x] Lovenox                                 [] SCDs                                 [] SQ Heparin                                 [] Encourage ambulation           [] Already on Anticoagulation     Code Status: Full Code    Tele:   [x] yes             [] no    Active Hospital Problems    Diagnosis Date Noted    Pneumonia due to COVID-19 virus [U07.1, J12.82] 11/20/2021       Electronically signed by Tatum Pringle MD on 11/22/2021 at 10:30 PM

## 2021-11-23 NOTE — DISCHARGE SUMMARY
Discharge Summary     Patient Identification:  Maron Aschoff. : 1975  MRN: [id number]   Account: [de-identified]     Admit date: 2021  Discharge date: 2021   Attending provider: Paige Sainz MD        Primary care provider: ARLEEN Monet - NP     Discharge Diagnoses:   #Acute hypoxic respiratory failure due to COVID-19 pneumonia: SIRS 3/4 (fever, tachycardia, tachypnea). Unvaccinated. Febrile (Tmax 101. 3) with cough, nausea, vomiting, diarrhea. Procal 0.18. CRP 10 Associated tachypnea intermittently. Will start on Decadron 6mg PO daily. Meets criteria for Baricitinib- pharmacy consulted. No indications for antibiotics at this time. Will give another 1L bolus and start on  cc/hr. Currently on 3L NC saturating 94%. Wean off as needed with SpO2 goal >90%. Not on oxygen at baseline. Current smoker. No other history of lung disease.   - : ON 3L NC w/O2 sats >90%  - : Still hypoxic, decreased fluids now that pt is able to tolerate good PO intake. Added Incentive spirometry, acapella, DuoNebs prn (schedule if ongoing oxygen issues)     #Sepsis 2/2 covid 19 pneumonia (POA, resolved): tachycardia, fevers. Did not receive full 30cc/kg in ED (2700 cc based on IBW), so was given an additional 1L bolus to complete this upon admission. His lactic was wnl.     Acute Diarrhea a/w N/V likely 2/2 COVID-19 vs EAEC, likely combination. Has been going on for the last few days- unclear timeline. Anti-emetics prn. Patient given food in the ED and wants to eat so will give diet. Monitor. On IVF. PTOT. 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 There has not been any recent exposure to antibiotics. Supportive care. Since he appears dehydrated and is receiving IV steroids for COVID, will treat his E Coli diarrhea to prevent progression, plan for 3 days of Azithromycin 500mg     Mild hypovolemic hyponatremia: Na 131 on admission.  Likely due to experienced a dramatic emotional loss today as his Uncle who was in the ICU at [facility], passed this morning. Pt wanted to leave AMA today after receiving the news, however agreed to stay for an O2 Evaluation that demonstrated pt oxygen needs 0L/2L, but given his continued progression and the insensible COVID stay, he does not need it at home. Guidelines also indicate pt does not need to go home on Decadron/Baricitinib given his stable condition, home d/c, and NO realistic oxygen needs, however given pt's multiple risk factors for pulmonary complications (SAM not on treatment, tobacco abuse, overweight, DM2) 2 days of PO decadron 1mg BID has been ordered for d/c. Discharge Medications:     Medication List      START taking these medications    dexamethasone 1 MG tablet  Commonly known as: DECADRON  Take 1 tablet by mouth 2 times daily (with meals) for 2 days        CONTINUE taking these medications    amLODIPine 10 MG tablet  Commonly known as: NORVASC  Take 1 tablet by mouth daily for 30 days. aspirin 81 MG EC tablet     atorvastatin 20 MG tablet  Commonly known as: LIPITOR     glipiZIDE XL 10 MG extended release tablet  Generic drug: glipiZIDE     hydroCHLOROthiazide 12.5 MG capsule  Commonly known as: MICROZIDE     lisinopril 20 MG tablet  Commonly known as: PRINIVIL;ZESTRIL     * metFORMIN (MOD) 1000 MG extended release tablet  Commonly known as: GLUMETZA  Take 1 tablet by mouth 2 times daily (with meals) for 30 days. * metFORMIN 500 MG extended release tablet  Commonly known as: GLUCOPHAGE-XR     metoprolol succinate 50 MG extended release tablet  Commonly known as: TOPROL XL  Take 1 tablet by mouth daily. pioglitazone 45 MG tablet  Commonly known as: ACTOS     * tadalafil 5 MG tablet  Commonly known as: CIALIS     * tadalafil 10 MG tablet  Commonly known as: CIALIS         * This list has 4 medication(s) that are the same as other medications prescribed for you.  Read the directions carefully, and ask your doctor or other care provider to review them with you. STOP taking these medications    Accu-Chek Softclix Lancets Misc     pravastatin 40 MG tablet  Commonly known as: PRAVACHOL           Where to Get Your Medications      These medications were sent to [facility]/pharmacy #[id number]- [place], [place] - [address] [phone]  [address] [place] [address] Kimmy Walters    Phone: [phone]   · dexamethasone 1 MG tablet         Patient Instructions:    Diet: ADULT DIET; Regular; 4 carb choices (60 gm/meal)    Code Status: Full Code    Follow-up visits:   Karen Parker NP  [address] [phone]          Examination:  Vitals:  Vitals:    11/22/21 2330 11/23/21 0324 11/23/21 0800 11/23/21 1044   BP: (!) 140/89 121/78 128/78    Pulse: 66 64 56    Resp: 18 18 18    Temp: 98.1 °F (36.7 °C) 97.5 °F (36.4 °C) 98.4 °F (36.9 °C)    TempSrc: Oral Oral Oral    SpO2: 90% 92% 90% 92%   Weight:       Height:         Weight: Weight: (!) 340 lb (154.2 kg)     24 hour intake/output:    Intake/Output Summary (Last 24 hours) at 11/23/2021 1123  Last data filed at 11/23/2021 7166  Gross per 24 hour   Intake 7086.4 ml   Output --   Net 7086.4 ml       General appearance: No apparent distress, appears stated age and cooperative. Appears acutely ill and dehydrated. HEENT: Normal cephalic, atraumatic without obvious deformity. Extra ocular muscles intact. Conjunctivae/corneas clear. Neck: Supple, with full range of motion. No jugular venous distention. Trachea midline. Respiratory:  Normal respiratory effort. Diminished lung sounds bilaterally without Rales/Wheezes/Rhonchi. Cardiovascular: Regular rate and rhythm with normal S1/S2 without murmurs, rubs or gallops. Abdomen: Soft, non-tender but sensitive to palpation, non-distended. Musculoskeletal:  No clubbing, cyanosis or edema bilaterally.   Skin: Skin color, texture, turgor normal.  No rashes or lesions. Neurologic:  Neurovascularly intact without any focal sensory/motor deficits. Cranial nerves: II-XII intact, grossly non-focal.  Psychiatric: Alert and oriented, thought content appropriate, normal insight  Capillary Refill: Brisk,< 3 seconds   Peripheral Pulses: +2 palpable, equal bilaterally    Significant Diagnostics:   Radiology: CTA CHEST W WO CONTRAST    Result Date: 11/20/2021  PROCEDURE: CTA CHEST W WO CONTRAST CLINICAL INFORMATION: Covid; chest pain/SOB. COMPARISON: Chest x-ray 11/20/2021. TECHNIQUE: 3 mm axial images were obtained through the chest after the administration of IV contrast.  A non-contrast localizer was obtained. 3D reconstructions were performed on the scanner to include MIP coronal and sagittal images through the chest. Isovue was the intravenous contrast utilized. All CT scans at this facility use dose modulation, iterative reconstruction, and/or weight-based dosing when appropriate to reduce radiation dose to as low as reasonably achievable. FINDINGS: There is adequate opacification of the pulmonary arterial system. No pulmonary emboli are present. The aorta is within acceptable limits. The heart size is normal. There is no pericardial or pleural effusion. There is no mediastinal, axillary or hilar adenopathy. There are bilateral scattered patchy areas of peripheral round and irregularly shaped pulmonary opacities consistent with infiltrates associated with Covid. These are much better seen on CT than x-ray. No suspicious osseous lesions are present. There are no suspicious findings in the imaged aspects of the upper abdomen. 1. No pulmonary emboli. 2. Mild to moderate severity bilateral patchy pulmonary opacities consistent with infiltrates associated with Covid 19. **This report has been created using voice recognition software. It may contain minor errors which are inherent in voice recognition technology. ** Final report electronically signed by Dr. Jo Ann Dasilva on 11/20/2021 1:50 PM    XR CHEST PORTABLE    Result Date: 11/20/2021  PROCEDURE: XR CHEST PORTABLE CLINICAL INFORMATION: cough. Cough and congestion. COMPARISON: Chest 11/16/2011. TECHNIQUE: AP upright view of the chest. FINDINGS: The heart size is normal.The mediastinum is not widened. There are some patchy infiltrates in the left midlung zone, right midlung zone and right lung base. There are no associated pleural effusions. The pulmonary vascularity is normal.No suspicious osseous lesions are present. New bilateral pulmonary opacities consistent with pulmonary infiltrates. **This report has been created using voice recognition software. It may contain minor errors which are inherent in voice recognition technology. ** Final report electronically signed by Dr. Jo Ann Dasilva on 11/20/2021 12:17 PM      Labs:   Recent Results (from the past 72 hour(s))   EKG Emergency    Collection Time: 11/20/21 11:55 AM   Result Value Ref Range    Ventricular Rate 99 BPM    Atrial Rate 99 BPM    P-R Interval 122 ms    QRS Duration 74 ms    Q-T Interval 326 ms    QTc Calculation (Bazett) 418 ms    P Axis 58 degrees    R Axis -2 degrees    T Axis 46 degrees   COVID-19, Rapid    Collection Time: 11/20/21 12:00 PM    Specimen: Nasopharyngeal Swab   Result Value Ref Range    SARS-CoV-2, NAAT DETECTED (AA) NOT DETECTED   CBC auto differential    Collection Time: 11/20/21 12:11 PM   Result Value Ref Range    WBC 4.7 (L) 4.8 - 10.8 thou/mm3    RBC 5.36 4.70 - 6.10 mill/mm3    Hemoglobin 14.6 14.0 - 18.0 gm/dl    Hematocrit 44.7 42.0 - 52.0 %    MCV 83.4 80.0 - 94.0 fL    MCH 27.2 26.0 - 33.0 pg    MCHC 32.7 32.2 - 35.5 gm/dl    RDW-CV 13.8 11.5 - 14.5 %    RDW-SD 41.7 35.0 - 45.0 fL    Platelets 700 657 - 544 thou/mm3    MPV 10.4 9.4 - 12.4 fL    Seg Neutrophils 64.0 %    Lymphocytes 23.5 %    Monocytes 11.9 %    Eosinophils 0.0 %    Basophils 0.2 %    Immature Granulocytes 0.4 %    Atypical Lymphocytes FEW %    Platelet Estimate ADEQUATE Adequate    Segs Absolute 3.0 1.8 - 7.7 thou/mm3    Lymphocytes Absolute 1.1 1.0 - 4.8 thou/mm3    Monocytes Absolute 0.6 0.4 - 1.3 thou/mm3    Eosinophils Absolute 0.0 0.0 - 0.4 thou/mm3    Basophils Absolute 0.0 0.0 - 0.1 thou/mm3    Immature Grans (Abs) 0.02 0.00 - 0.07 thou/mm3    nRBC 0 /100 wbc   Comprehensive Metabolic Panel w/ Reflex to MG    Collection Time: 11/20/21 12:11 PM   Result Value Ref Range    Glucose 265 (H) 70 - 108 mg/dL    CREATININE 1.2 0.4 - 1.2 mg/dL    BUN 17 7 - 22 mg/dL    Sodium 131 (L) 135 - 145 meq/L    Potassium reflex Magnesium 4.5 3.5 - 5.2 meq/L    Chloride 91 (L) 98 - 111 meq/L    CO2 28 23 - 33 meq/L    Calcium 8.7 8.5 - 10.5 mg/dL    AST 30 5 - 40 U/L    Alkaline Phosphatase 57 38 - 126 U/L    Total Protein 7.3 6.1 - 8.0 g/dL    Albumin 3.8 3.5 - 5.1 g/dL    Total Bilirubin 0.3 0.3 - 1.2 mg/dL    ALT 42 11 - 66 U/L   Troponin    Collection Time: 11/20/21 12:11 PM   Result Value Ref Range    Troponin T < 0.010 ng/ml   Brain Natriuretic Peptide    Collection Time: 11/20/21 12:11 PM   Result Value Ref Range    Pro-BNP 33.4 0.0 - 450.0 pg/mL   C-reactive protein    Collection Time: 11/20/21 12:11 PM   Result Value Ref Range    CRP 10.44 (H) 0.00 - 1.00 mg/dl   Procalcitonin    Collection Time: 11/20/21 12:11 PM   Result Value Ref Range    Procalcitonin 0.18 (H) 0.01 - 0.09 ng/mL   Lactate, Sepsis    Collection Time: 11/20/21 12:11 PM   Result Value Ref Range    Lactic Acid, Sepsis 1.1 0.5 - 1.9 mmol/L   Ferritin    Collection Time: 11/20/21 12:11 PM   Result Value Ref Range    Ferritin 1,361 (H) 22 - 322 ng/mL   Anion Gap    Collection Time: 11/20/21 12:11 PM   Result Value Ref Range    Anion Gap 12.0 8.0 - 16.0 meq/L   Glomerular Filtration Rate, Estimated    Collection Time: 11/20/21 12:11 PM   Result Value Ref Range    Est, Glom Filt Rate 79 (A) ml/min/1.73m2   Osmolality    Collection Time: 11/20/21 12:11 PM   Result Value Ref Range    Osmolality Calc 273.5 (L) 275.0 - 300.0 mOsmol/kg   Scan of Blood Smear    Collection Time: 11/20/21 12:11 PM   Result Value Ref Range    SCAN OF BLOOD SMEAR see below    Phosphorus    Collection Time: 11/20/21 12:11 PM   Result Value Ref Range    Phosphorus 3.1 2.4 - 4.7 mg/dL   POCT Glucose    Collection Time: 11/20/21  6:09 PM   Result Value Ref Range    POC Glucose 298 (H) 70 - 108 mg/dl   Gastrointestinal Panel, Molecular    Collection Time: 11/20/21  6:21 PM    Specimen: Stool   Result Value Ref Range    Campylobacter PCR Not Detected Not Detected    Clostridium difficile, PCR NA Not Detected    Plesiomonas Shigelloides PCR Not Detected Not Detected    Salmonella PCR Not Detected Not Detected    Vibrio PCR Not Detected Not Detected    Vibrio Cholerae PCR Not Detected Not Detected    Yersinia Enterocolitica PCR Not Detected Not Detected    E Coli Enteroaggregative PCR Detected (A) Not Detected    E Coli Enteropathogenic PCR Not Detected Not Detected    E Coli Enterotoxigenic PCR Not Detected Not Detected    E Coli Shiga Like Toxin PCR Not Detected Not Detected    E Coli O157 PCR NA Not Detected    E Coli Shigella/Enteroinvasive PCR Not Detected Not Detected    Cryptosporidium PCR Not Detected Not Detected    Cyclospora Cayetanensis PCR Not Detected Not Detected    E HISTOLYTICA GI FILM ARRAY Not Detected Not Detected    Giardia Lamblia PCR Not Detected Not Detected    Adenovirus F 40 39 PCR Not Detected Not Detected    Astrovirus PCR Not Detected Not Detected    Norovirus GI GII PCR Not Detected Not Detected    Rotavirus A PCR Not Detected Not Detected    Sapovirus PCR Not Detected Not Detected   Clostridium Difficile Toxin/Antigen    Collection Time: 11/20/21  6:21 PM   Result Value Ref Range    C.diff Toxin/Antigen NEGATIVE    Culture, Stool    Collection Time: 11/20/21  6:21 PM    Specimen: Stool   Result Value Ref Range    Culture, Stool       No enteric pathogens (Salmonella, Shigella, Campylobacter, Aeromonas spp., Plesiomonas shigelloides, E. coli O157 or shiga-toxin producing (STEC) E. coli) isolated.     POCT Glucose    Collection Time: 11/20/21  9:36 PM   Result Value Ref Range    POC Glucose 308 (H) 70 - 108 mg/dl   Culture, Blood 1    Collection Time: 11/20/21 11:08 PM    Specimen: Blood   Result Value Ref Range    Blood Culture, Routine No growth-preliminary     Culture, Blood 2    Collection Time: 11/20/21 11:11 PM    Specimen: Blood   Result Value Ref Range    Blood Culture, Routine No growth-preliminary     Hemoglobin A1c    Collection Time: 11/21/21  6:45 AM   Result Value Ref Range    Hemoglobin A1C 8.8 (H) 4.4 - 6.4 %    AVERAGE GLUCOSE 204 (H) 70 - 126 mg/dL   Basic Metabolic Panel    Collection Time: 11/21/21  6:45 AM   Result Value Ref Range    Sodium 134 (L) 135 - 145 meq/L    Potassium 4.7 3.5 - 5.2 meq/L    Chloride 97 (L) 98 - 111 meq/L    CO2 28 23 - 33 meq/L    Glucose 195 (H) 70 - 108 mg/dL    BUN 17 7 - 22 mg/dL    CREATININE 1.1 0.4 - 1.2 mg/dL    Calcium 8.6 8.5 - 10.5 mg/dL   Magnesium    Collection Time: 11/21/21  6:45 AM   Result Value Ref Range    Magnesium 2.1 1.6 - 2.4 mg/dL   CBC    Collection Time: 11/21/21  6:45 AM   Result Value Ref Range    WBC 8.4 4.8 - 10.8 thou/mm3    RBC 5.02 4.70 - 6.10 mill/mm3    Hemoglobin 13.4 (L) 14.0 - 18.0 gm/dl    Hematocrit 42.8 42.0 - 52.0 %    MCV 85.3 80.0 - 94.0 fL    MCH 26.7 26.0 - 33.0 pg    MCHC 31.3 (L) 32.2 - 35.5 gm/dl    RDW-CV 13.7 11.5 - 14.5 %    RDW-SD 42.3 35.0 - 45.0 fL    Platelets 865 941 - 616 thou/mm3    MPV 10.6 9.4 - 12.4 fL   C-reactive protein    Collection Time: 11/21/21  6:45 AM   Result Value Ref Range    CRP 6.89 (H) 0.00 - 1.00 mg/dl   Anion Gap    Collection Time: 11/21/21  6:45 AM   Result Value Ref Range    Anion Gap 9.0 8.0 - 16.0 meq/L   Glomerular Filtration Rate, Estimated    Collection Time: 11/21/21  6:45 AM   Result Value Ref Range    Est, Glom Filt Rate 87 (A) ml/min/1.73m2   Osmolality    Collection Time: 11/21/21  6:45 AM 4:38 PM   Result Value Ref Range    POC Glucose 357 (H) 70 - 108 mg/dl   POCT Glucose    Collection Time: 11/22/21  8:08 PM   Result Value Ref Range    POC Glucose 345 (H) 70 - 108 mg/dl   Basic Metabolic Panel    Collection Time: 11/23/21  6:52 AM   Result Value Ref Range    Sodium 132 (L) 135 - 145 meq/L    Potassium 4.7 3.5 - 5.2 meq/L    Chloride 96 (L) 98 - 111 meq/L    CO2 24 23 - 33 meq/L    Glucose 270 (H) 70 - 108 mg/dL    BUN 23 (H) 7 - 22 mg/dL    CREATININE 1.0 0.4 - 1.2 mg/dL    Calcium 9.0 8.5 - 10.5 mg/dL   CBC    Collection Time: 11/23/21  6:52 AM   Result Value Ref Range    WBC 10.5 4.8 - 10.8 thou/mm3    RBC 4.99 4.70 - 6.10 mill/mm3    Hemoglobin 13.5 (L) 14.0 - 18.0 gm/dl    Hematocrit 42.2 42.0 - 52.0 %    MCV 84.6 80.0 - 94.0 fL    MCH 27.1 26.0 - 33.0 pg    MCHC 32.0 (L) 32.2 - 35.5 gm/dl    RDW-CV 13.3 11.5 - 14.5 %    RDW-SD 40.9 35.0 - 45.0 fL    Platelets 731 141 - 805 thou/mm3    MPV 10.5 9.4 - 12.4 fL   Anion Gap    Collection Time: 11/23/21  6:52 AM   Result Value Ref Range    Anion Gap 12.0 8.0 - 16.0 meq/L   Glomerular Filtration Rate, Estimated    Collection Time: 11/23/21  6:52 AM   Result Value Ref Range    Est, Glom Filt Rate >90 ml/min/1.73m2   POCT Glucose    Collection Time: 11/23/21  7:34 AM   Result Value Ref Range    POC Glucose 251 (H) 70 - 108 mg/dl       Discharge condition: good  Disposition: Home  Time spent on discharge: greater than 50 minutes     Electronically signed by Deangelo Hernández MD on 11/23/2021 at 11:23 AM

## 2021-11-23 NOTE — PROGRESS NOTES
A home oxygen evaluation has been completed. [x]Patient is an inpatient. It is expected that the patient will be discharged within the next 48 hours. Qualified provider to write order for home prescription if patient qualifies. Social service/care managers will arrange for home oxygen. If patient is active, arrange for Home Medical supplier to assess for Oxygen Conserving Device per pulse oximetry. []Patient is an outpatient. Results will be faxed to the ordering provider. Qualified provider to write order for home prescription if patient qualifies and arranges for home oxygen. Patient was placed on room air for 15 minutes. SpO2 was 92 % on room air at rest. Patients SpO2 was 89% or above and did not qualify for home oxygen. Patient was walked for 6 minutes. SpO2 was 87 % during walking. Patients SpO2 was below 89% and qualified for home oxygen. Oxygen was applied at 2 lpm via nasal cannula to maintain a SpO2 between 90-92% while walking. Actual SpO2 was 90 %.

## 2021-11-24 ENCOUNTER — CARE COORDINATION (OUTPATIENT)
Dept: CASE MANAGEMENT | Age: 46
End: 2021-11-24

## 2021-11-24 NOTE — CARE COORDINATION
Covid-19 Initial Follow Up Call    Patient contacted regarding COVID-19 risk, exposure and diagnosis. Discussed COVID-19 related testing which was available at this time. Test results were positive. Patient informed of results, if available? Yes. Care Transition Nurse contacted the patient by telephone to perform post discharge assessment. Call within 2 business days of discharge: Yes. Verified name and  with patient as identifiers. Provided introduction to self, and explanation of the CTN/ACM role, and reason for call due to risk factors for infection and/or exposure to COVID-19. Spoke with Darl Elders, said he is feeling good. Denies any symptoms except for an occasional cough, bringing up phlegm sometimes. Encouraged to use the IS several times a day. Reviewed medications. Doesn't have much of an appetite yet but is eating and drinking adequate fluids. Has f/u with PCP . No other questions or concerns at this time. Will continue to follow. Symptoms reviewed with patient who verbalized the following symptoms: cough, no new symptoms and no worsening symptoms. Due to no new or worsening symptoms encounter was not routed to provider for escalation. Discussed follow-up appointments. If no appointment was previously scheduled, appointment scheduling offered: na. [facility] follow up appointment(s): No future appointments. Non-[facility] follow up appointment(s): PCP     Non-face-to-face services provided:  Scheduled appointment with PCP-  Obtained and reviewed discharge summary and/or continuity of care documents     Advance Care Planning:   Does patient have an Advance Directive:  patient declined education. Educated patient about risk for severe COVID-19 due to risk factors according to CDC guidelines. CTN reviewed discharge instructions, medical action plan and red flag symptoms with the patient who verbalized understanding. Discussed COVID vaccination status: Yes.  Education provided on COVID-19 vaccination as appropriate. Discussed exposure protocols and quarantine with CDC Guidelines. Patient was given an opportunity to verbalize any questions and concerns and agrees to contact CTN or health care provider for questions related to their healthcare. Reviewed and educated patient on any new and changed medications related to discharge diagnosis     Was patient discharged with a pulse oximeter? No     CTN provided contact information. Plan for follow-up call in 5-7 days based on severity of symptoms and risk factors.

## 2021-11-26 LAB
BLOOD CULTURE, ROUTINE: NORMAL
BLOOD CULTURE, ROUTINE: NORMAL

## 2021-12-03 ENCOUNTER — CARE COORDINATION (OUTPATIENT)
Dept: CASE MANAGEMENT | Age: 46
End: 2021-12-03

## 2021-12-03 NOTE — CARE COORDINATION
Covid-19 Subsequent Follow Up Call    Date/Time:  12/3/2021 3:07 PM  Attempted to reach patient by telephone. Call within 2 business days of discharge: Yes Unable to reach patient, voicemail not set up. Will attempt to reach patient again.

## 2021-12-10 ENCOUNTER — CARE COORDINATION (OUTPATIENT)
Dept: CASE MANAGEMENT | Age: 46
End: 2021-12-10

## 2021-12-10 NOTE — CARE COORDINATION
Covid-19 Subsequent Follow Up Call-2nd attempt    Date/Time:  12/10/2021 1:56 PM  Attempted to reach patient by telephone. Call within 2 business days of discharge: Yes Unable to reach patient, voicemail not set up. If no return call, CTN will sign off-2nd attempt.

## 2022-02-14 ENCOUNTER — HOSPITAL ENCOUNTER (OUTPATIENT)
Age: 47
Setting detail: SPECIMEN
Discharge: HOME | End: 2022-02-14

## 2022-02-14 LAB
ABSOLUTE EOS #: 0.18 K/UL (ref 0–0.44)
ABSOLUTE IMMATURE GRANULOCYTE: 0.03 K/UL (ref 0–0.3)
ABSOLUTE LYMPH #: 1.93 K/UL (ref 1.1–3.7)
ABSOLUTE MONO #: 0.73 K/UL (ref 0.1–1.2)
ALBUMIN SERPL-MCNC: 4.1 G/DL (ref 3.5–5.2)
ALBUMIN/GLOBULIN RATIO: 1.4 (ref 1–2.5)
ALP BLD-CCNC: 65 U/L (ref 40–129)
ALT SERPL-CCNC: 15 U/L (ref 5–41)
ANION GAP SERPL CALCULATED.3IONS-SCNC: 12 MMOL/L (ref 9–17)
AST SERPL-CCNC: 7 U/L
BASOPHILS # BLD: 0 % (ref 0–2)
BASOPHILS ABSOLUTE: 0.03 K/UL (ref 0–0.2)
BILIRUB SERPL-MCNC: 0.18 MG/DL (ref 0.3–1.2)
BUN BLDV-MCNC: 15 MG/DL (ref 6–20)
BUN/CREAT BLD: ABNORMAL (ref 9–20)
CALCIUM SERPL-MCNC: 9.7 MG/DL (ref 8.6–10.4)
CHLORIDE BLD-SCNC: 97 MMOL/L (ref 98–107)
CHOLESTEROL/HDL RATIO: 4.6
CHOLESTEROL: 223 MG/DL
CO2: 26 MMOL/L (ref 20–31)
CREAT SERPL-MCNC: 1.11 MG/DL (ref 0.7–1.2)
DIFFERENTIAL TYPE: NORMAL
EOSINOPHILS RELATIVE PERCENT: 3 % (ref 1–4)
GFR AFRICAN AMERICAN: >60 ML/MIN
GFR NON-AFRICAN AMERICAN: >60 ML/MIN
GFR SERPL CREATININE-BSD FRML MDRD: ABNORMAL ML/MIN/{1.73_M2}
GFR SERPL CREATININE-BSD FRML MDRD: ABNORMAL ML/MIN/{1.73_M2}
GLUCOSE BLD-MCNC: 280 MG/DL (ref 70–99)
HCT VFR BLD CALC: 44.3 % (ref 40.7–50.3)
HDLC SERPL-MCNC: 48 MG/DL
HEMOGLOBIN: 13.7 G/DL (ref 13–17)
IMMATURE GRANULOCYTES: 0 %
LDL CHOLESTEROL: 107 MG/DL (ref 0–130)
LYMPHOCYTES # BLD: 28 % (ref 24–43)
MCH RBC QN AUTO: 27 PG (ref 25.2–33.5)
MCHC RBC AUTO-ENTMCNC: 30.9 G/DL (ref 28.4–34.8)
MCV RBC AUTO: 87.2 FL (ref 82.6–102.9)
MONOCYTES # BLD: 11 % (ref 3–12)
NRBC AUTOMATED: 0 PER 100 WBC
PDW BLD-RTO: 14.2 % (ref 11.8–14.4)
PLATELET # BLD: 297 K/UL (ref 138–453)
PLATELET ESTIMATE: NORMAL
PMV BLD AUTO: 11.4 FL (ref 8.1–13.5)
POTASSIUM SERPL-SCNC: 4.7 MMOL/L (ref 3.7–5.3)
RBC # BLD: 5.08 M/UL (ref 4.21–5.77)
RBC # BLD: NORMAL 10*6/UL
SEG NEUTROPHILS: 58 % (ref 36–65)
SEGMENTED NEUTROPHILS ABSOLUTE COUNT: 3.95 K/UL (ref 1.5–8.1)
SODIUM BLD-SCNC: 135 MMOL/L (ref 135–144)
TOTAL PROTEIN: 7 G/DL (ref 6.4–8.3)
TRIGL SERPL-MCNC: 342 MG/DL
VLDLC SERPL CALC-MCNC: ABNORMAL MG/DL (ref 1–30)
WBC # BLD: 6.9 K/UL (ref 3.5–11.3)
WBC # BLD: NORMAL 10*3/UL

## 2022-11-07 ENCOUNTER — HOSPITAL ENCOUNTER (OUTPATIENT)
Age: 47
Setting detail: SPECIMEN
Discharge: HOME | End: 2022-11-07

## 2022-11-07 LAB
ABSOLUTE EOS #: 0.16 K/UL (ref 0–0.44)
ABSOLUTE IMMATURE GRANULOCYTE: 0.04 K/UL (ref 0–0.3)
ABSOLUTE LYMPH #: 2.05 K/UL (ref 1.1–3.7)
ABSOLUTE MONO #: 0.68 K/UL (ref 0.1–1.2)
BASOPHILS # BLD: 0 % (ref 0–2)
BASOPHILS ABSOLUTE: 0.03 K/UL (ref 0–0.2)
EOSINOPHILS RELATIVE PERCENT: 2 % (ref 1–4)
HCT VFR BLD CALC: 46.4 % (ref 40.7–50.3)
HEMOGLOBIN: 14.2 G/DL (ref 13–17)
IMMATURE GRANULOCYTES: 1 %
LYMPHOCYTES # BLD: 26 % (ref 24–43)
MCH RBC QN AUTO: 26.7 PG (ref 25.2–33.5)
MCHC RBC AUTO-ENTMCNC: 30.6 G/DL (ref 28.4–34.8)
MCV RBC AUTO: 87.4 FL (ref 82.6–102.9)
MONOCYTES # BLD: 9 % (ref 3–12)
NRBC AUTOMATED: 0 PER 100 WBC
PDW BLD-RTO: 14.4 % (ref 11.8–14.4)
PLATELET # BLD: 302 K/UL (ref 138–453)
PMV BLD AUTO: 10.6 FL (ref 8.1–13.5)
RBC # BLD: 5.31 M/UL (ref 4.21–5.77)
SEG NEUTROPHILS: 62 % (ref 36–65)
SEGMENTED NEUTROPHILS ABSOLUTE COUNT: 4.84 K/UL (ref 1.5–8.1)
WBC # BLD: 7.8 K/UL (ref 3.5–11.3)

## 2022-11-08 LAB
ALBUMIN SERPL-MCNC: 4.2 G/DL (ref 3.5–5.2)
ALBUMIN/GLOBULIN RATIO: 1.4 (ref 1–2.5)
ALP BLD-CCNC: 54 U/L (ref 40–129)
ALT SERPL-CCNC: 14 U/L (ref 5–41)
ANION GAP SERPL CALCULATED.3IONS-SCNC: 13 MMOL/L (ref 9–17)
AST SERPL-CCNC: 18 U/L
BILIRUB SERPL-MCNC: 0.2 MG/DL (ref 0.3–1.2)
BUN BLDV-MCNC: 13 MG/DL (ref 6–20)
CALCIUM SERPL-MCNC: 9.4 MG/DL (ref 8.6–10.4)
CHLORIDE BLD-SCNC: 99 MMOL/L (ref 98–107)
CHOLESTEROL/HDL RATIO: 3.6
CHOLESTEROL: 157 MG/DL
CO2: 25 MMOL/L (ref 20–31)
CREAT SERPL-MCNC: 0.93 MG/DL (ref 0.7–1.2)
GFR SERPL CREATININE-BSD FRML MDRD: >60 ML/MIN/1.73M2
GLUCOSE BLD-MCNC: 163 MG/DL (ref 70–99)
HDLC SERPL-MCNC: 44 MG/DL
LDL CHOLESTEROL: 78 MG/DL (ref 0–130)
POTASSIUM SERPL-SCNC: 4.6 MMOL/L (ref 3.7–5.3)
SODIUM BLD-SCNC: 137 MMOL/L (ref 135–144)
TOTAL PROTEIN: 7.1 G/DL (ref 6.4–8.3)
TRIGL SERPL-MCNC: 177 MG/DL

## 2023-06-13 NOTE — ED NOTES
Pt resting in bed, eyes closed. VSS, RR is regular and unlabored. No signs of distress. Call light in reach. Bed rails up x 2.       Jannie Javed RN  11/21/21 7240 Solaraze Counseling:  I discussed with the patient the risks of Solaraze including but not limited to erythema, scaling, itching, weeping, crusting, and pain.

## 2023-10-13 ENCOUNTER — HOSPITAL ENCOUNTER (OUTPATIENT)
Age: 48
Setting detail: SPECIMEN
Discharge: HOME | End: 2023-10-13

## 2023-10-13 LAB
ALBUMIN SERPL-MCNC: 3.9 G/DL (ref 3.5–5.2)
ALBUMIN/GLOB SERPL: 1.3 {RATIO} (ref 1–2.5)
ALP SERPL-CCNC: 61 U/L (ref 40–129)
ALT SERPL-CCNC: 18 U/L (ref 5–41)
ANION GAP SERPL CALCULATED.3IONS-SCNC: 11 MMOL/L (ref 9–17)
AST SERPL-CCNC: 12 U/L
BASOPHILS # BLD: 0.03 K/UL (ref 0–0.2)
BASOPHILS NFR BLD: 0 % (ref 0–2)
BILIRUB SERPL-MCNC: 0.2 MG/DL (ref 0.3–1.2)
BUN SERPL-MCNC: 19 MG/DL (ref 6–20)
CALCIUM SERPL-MCNC: 9.1 MG/DL (ref 8.6–10.4)
CHLORIDE SERPL-SCNC: 100 MMOL/L (ref 98–107)
CHOLEST SERPL-MCNC: 133 MG/DL
CHOLESTEROL/HDL RATIO: 3.2
CO2 SERPL-SCNC: 27 MMOL/L (ref 20–31)
CREAT SERPL-MCNC: 1 MG/DL (ref 0.7–1.2)
EOSINOPHIL # BLD: 0.09 K/UL (ref 0–0.44)
EOSINOPHILS RELATIVE PERCENT: 1 % (ref 1–4)
ERYTHROCYTE [DISTWIDTH] IN BLOOD BY AUTOMATED COUNT: 14.9 % (ref 11.8–14.4)
GFR SERPL CREATININE-BSD FRML MDRD: >60 ML/MIN/1.73M2
GLUCOSE SERPL-MCNC: 130 MG/DL (ref 70–99)
HCT VFR BLD AUTO: 42.6 % (ref 40.7–50.3)
HDLC SERPL-MCNC: 42 MG/DL
HGB BLD-MCNC: 13.1 G/DL (ref 13–17)
IMM GRANULOCYTES # BLD AUTO: 0.04 K/UL (ref 0–0.3)
IMM GRANULOCYTES NFR BLD: 1 %
LDLC SERPL CALC-MCNC: 61 MG/DL (ref 0–130)
LYMPHOCYTES NFR BLD: 1.78 K/UL (ref 1.1–3.7)
LYMPHOCYTES RELATIVE PERCENT: 25 % (ref 24–43)
MCH RBC QN AUTO: 26.8 PG (ref 25.2–33.5)
MCHC RBC AUTO-ENTMCNC: 30.8 G/DL (ref 28.4–34.8)
MCV RBC AUTO: 87.3 FL (ref 82.6–102.9)
MONOCYTES NFR BLD: 0.75 K/UL (ref 0.1–1.2)
MONOCYTES NFR BLD: 11 % (ref 3–12)
NEUTROPHILS NFR BLD: 62 % (ref 36–65)
NEUTS SEG NFR BLD: 4.36 K/UL (ref 1.5–8.1)
NRBC BLD-RTO: 0 PER 100 WBC
PLATELET # BLD AUTO: 285 K/UL (ref 138–453)
PMV BLD AUTO: 10.4 FL (ref 8.1–13.5)
POTASSIUM SERPL-SCNC: 4.2 MMOL/L (ref 3.7–5.3)
PROT SERPL-MCNC: 6.8 G/DL (ref 6.4–8.3)
RBC # BLD AUTO: 4.88 M/UL (ref 4.21–5.77)
RBC # BLD: ABNORMAL 10*6/UL
SODIUM SERPL-SCNC: 138 MMOL/L (ref 135–144)
TRIGL SERPL-MCNC: 152 MG/DL
WBC OTHER # BLD: 7.1 K/UL (ref 3.5–11.3)

## 2024-04-18 ENCOUNTER — HOSPITAL ENCOUNTER (EMERGENCY)
Age: 49
Discharge: HOME | End: 2024-04-18
Attending: EMERGENCY MEDICINE
Payer: COMMERCIAL

## 2024-04-18 ENCOUNTER — APPOINTMENT (OUTPATIENT)
Dept: GENERAL RADIOLOGY | Age: 49
End: 2024-04-18
Payer: COMMERCIAL

## 2024-04-18 VITALS
DIASTOLIC BLOOD PRESSURE: 61 MMHG | BODY MASS INDEX: 42.66 KG/M2 | TEMPERATURE: 97.8 F | SYSTOLIC BLOOD PRESSURE: 114 MMHG | WEIGHT: 315 LBS | HEIGHT: 72 IN | RESPIRATION RATE: 18 BRPM | OXYGEN SATURATION: 94 % | HEART RATE: 70 BPM

## 2024-04-18 DIAGNOSIS — R07.9 CHEST PAIN, UNSPECIFIED TYPE: Primary | ICD-10-CM

## 2024-04-18 DIAGNOSIS — R07.89 ATYPICAL CHEST PAIN: ICD-10-CM

## 2024-04-18 LAB
ANION GAP SERPL CALC-SCNC: 12 MEQ/L (ref 8–16)
BASOPHILS ABSOLUTE: 0.1 THOU/MM3 (ref 0–0.1)
BASOPHILS NFR BLD AUTO: 0.6 %
BUN SERPL-MCNC: 24 MG/DL (ref 7–22)
CALCIUM SERPL-MCNC: 9.4 MG/DL (ref 8.5–10.5)
CHLORIDE SERPL-SCNC: 96 MEQ/L (ref 98–111)
CO2 SERPL-SCNC: 29 MEQ/L (ref 23–33)
CREAT SERPL-MCNC: 1.6 MG/DL (ref 0.4–1.2)
DEPRECATED RDW RBC AUTO: 43.9 FL (ref 35–45)
EKG ATRIAL RATE: 83 BPM
EKG P AXIS: 50 DEGREES
EKG P-R INTERVAL: 136 MS
EKG Q-T INTERVAL: 368 MS
EKG QRS DURATION: 80 MS
EKG QTC CALCULATION (BAZETT): 432 MS
EKG R AXIS: -2 DEGREES
EKG T AXIS: 36 DEGREES
EKG VENTRICULAR RATE: 83 BPM
EOSINOPHIL NFR BLD AUTO: 2.7 %
EOSINOPHILS ABSOLUTE: 0.2 THOU/MM3 (ref 0–0.4)
ERYTHROCYTE [DISTWIDTH] IN BLOOD BY AUTOMATED COUNT: 14.1 % (ref 11.5–14.5)
GFR SERPL CREATININE-BSD FRML MDRD: 53 ML/MIN/1.73M2
GLUCOSE SERPL-MCNC: 150 MG/DL (ref 70–108)
HCT VFR BLD AUTO: 45.5 % (ref 42–52)
HGB BLD-MCNC: 14.3 GM/DL (ref 14–18)
IMM GRANULOCYTES # BLD AUTO: 0.03 THOU/MM3 (ref 0–0.07)
IMM GRANULOCYTES NFR BLD AUTO: 0.4 %
LYMPHOCYTES ABSOLUTE: 2.1 THOU/MM3 (ref 1–4.8)
LYMPHOCYTES NFR BLD AUTO: 24.7 %
MAGNESIUM SERPL-MCNC: 2.1 MG/DL (ref 1.6–2.4)
MCH RBC QN AUTO: 27 PG (ref 26–33)
MCHC RBC AUTO-ENTMCNC: 31.4 GM/DL (ref 32.2–35.5)
MCV RBC AUTO: 85.8 FL (ref 80–94)
MONOCYTES ABSOLUTE: 0.9 THOU/MM3 (ref 0.4–1.3)
MONOCYTES NFR BLD AUTO: 10.1 %
NEUTROPHILS NFR BLD AUTO: 61.5 %
NRBC BLD AUTO-RTO: 0 /100 WBC
NT-PROBNP SERPL IA-MCNC: < 36 PG/ML (ref 0–124)
OSMOLALITY SERPL CALC.SUM OF ELEC: 280.7 MOSMOL/KG (ref 275–300)
PLATELET # BLD AUTO: 312 THOU/MM3 (ref 130–400)
PMV BLD AUTO: 10.1 FL (ref 9.4–12.4)
POTASSIUM SERPL-SCNC: 3.5 MEQ/L (ref 3.5–5.2)
RBC # BLD AUTO: 5.3 MILL/MM3 (ref 4.7–6.1)
SEGMENTED NEUTROPHILS ABSOLUTE COUNT: 5.2 THOU/MM3 (ref 1.8–7.7)
SODIUM SERPL-SCNC: 137 MEQ/L (ref 135–145)
TROPONIN, HIGH SENSITIVITY: 14 NG/L (ref 0–12)
TROPONIN, HIGH SENSITIVITY: 18 NG/L (ref 0–12)
WBC # BLD AUTO: 8.5 THOU/MM3 (ref 4.8–10.8)

## 2024-04-18 PROCEDURE — 93010 ELECTROCARDIOGRAM REPORT: CPT | Performed by: INTERNAL MEDICINE

## 2024-04-18 PROCEDURE — 83880 ASSAY OF NATRIURETIC PEPTIDE: CPT

## 2024-04-18 PROCEDURE — 36415 COLL VENOUS BLD VENIPUNCTURE: CPT

## 2024-04-18 PROCEDURE — 85025 COMPLETE CBC W/AUTO DIFF WBC: CPT

## 2024-04-18 PROCEDURE — 99285 EMERGENCY DEPT VISIT HI MDM: CPT

## 2024-04-18 PROCEDURE — 83735 ASSAY OF MAGNESIUM: CPT

## 2024-04-18 PROCEDURE — 93005 ELECTROCARDIOGRAM TRACING: CPT | Performed by: EMERGENCY MEDICINE

## 2024-04-18 PROCEDURE — 84484 ASSAY OF TROPONIN QUANT: CPT

## 2024-04-18 PROCEDURE — 71045 X-RAY EXAM CHEST 1 VIEW: CPT

## 2024-04-18 PROCEDURE — 2580000003 HC RX 258: Performed by: EMERGENCY MEDICINE

## 2024-04-18 PROCEDURE — 80048 BASIC METABOLIC PNL TOTAL CA: CPT

## 2024-04-18 PROCEDURE — 6370000000 HC RX 637 (ALT 250 FOR IP): Performed by: EMERGENCY MEDICINE

## 2024-04-18 RX ORDER — OMEPRAZOLE 20 MG/1
20 CAPSULE, DELAYED RELEASE ORAL
Qty: 30 CAPSULE | Refills: 0 | Status: SHIPPED | OUTPATIENT
Start: 2024-04-18

## 2024-04-18 RX ORDER — 0.9 % SODIUM CHLORIDE 0.9 %
1000 INTRAVENOUS SOLUTION INTRAVENOUS ONCE
Status: COMPLETED | OUTPATIENT
Start: 2024-04-18 | End: 2024-04-18

## 2024-04-18 RX ADMIN — SODIUM CHLORIDE 1000 ML: 9 INJECTION, SOLUTION INTRAVENOUS at 01:53

## 2024-04-18 RX ADMIN — ALUMINUM HYDROXIDE, MAGNESIUM HYDROXIDE, AND SIMETHICONE: 1200; 120; 1200 SUSPENSION ORAL at 03:35

## 2024-04-18 ASSESSMENT — PAIN - FUNCTIONAL ASSESSMENT
PAIN_FUNCTIONAL_ASSESSMENT: 0-10
PAIN_FUNCTIONAL_ASSESSMENT: NONE - DENIES PAIN
PAIN_FUNCTIONAL_ASSESSMENT: 0-10
PAIN_FUNCTIONAL_ASSESSMENT: NONE - DENIES PAIN

## 2024-04-18 ASSESSMENT — PAIN SCALES - GENERAL: PAINLEVEL_OUTOF10: 3

## 2024-04-18 NOTE — ED PROVIDER NOTES
capsule Take 1 capsule by mouth every morning (before breakfast), Disp-30 capsule, R-0Normal             (Please note that portions of this note were completed with a voice recognition program.  Efforts were made to edit the dictations but occasionally words are mis-transcribed.)    DO Emery Ryan Seth, DO  04/18/24 [id number]

## 2024-04-18 NOTE — ED NOTES
Patient resting in bed. Respirations easy and unlabored. No distress noted. Call light within reach. Updated on plan of care

## 2024-04-18 NOTE — ED TRIAGE NOTES
Pt present to ED from home with c/c of chest pain. Pt states pain began a few days ago and has felt light headed. Pt states chest pain comes and goes. Pt has been taking tylenol for pain. Pt denies any cardiac history or ever having chest pain in the past. Resp easy and unlabored. No distress noted.

## 2024-04-18 NOTE — ED NOTES
Patient resting in bed. Respirations easy and unlabored. No distress noted. Call light within reach.  Updated on plan of care.

## 2024-04-19 ENCOUNTER — OFFICE VISIT (OUTPATIENT)
Dept: CARDIOLOGY CLINIC | Age: 49
End: 2024-04-19
Payer: COMMERCIAL

## 2024-04-19 VITALS
SYSTOLIC BLOOD PRESSURE: 122 MMHG | HEART RATE: 80 BPM | HEIGHT: 78 IN | BODY MASS INDEX: 36.45 KG/M2 | DIASTOLIC BLOOD PRESSURE: 62 MMHG | WEIGHT: 315 LBS

## 2024-04-19 DIAGNOSIS — R06.02 SHORTNESS OF BREATH: ICD-10-CM

## 2024-04-19 DIAGNOSIS — I10 PRIMARY HYPERTENSION: Primary | ICD-10-CM

## 2024-04-19 DIAGNOSIS — E78.01 FAMILIAL HYPERCHOLESTEROLEMIA: ICD-10-CM

## 2024-04-19 PROCEDURE — 99204 OFFICE O/P NEW MOD 45 MIN: CPT | Performed by: NUCLEAR MEDICINE

## 2024-04-19 PROCEDURE — 3074F SYST BP LT 130 MM HG: CPT | Performed by: NUCLEAR MEDICINE

## 2024-04-19 PROCEDURE — 3078F DIAST BP <80 MM HG: CPT | Performed by: NUCLEAR MEDICINE

## 2024-04-19 ASSESSMENT — ENCOUNTER SYMPTOMS
SHORTNESS OF BREATH: 1
ABDOMINAL DISTENTION: 0
BLOOD IN STOOL: 0
PHOTOPHOBIA: 0
BACK PAIN: 0
COLOR CHANGE: 0
DIARRHEA: 0
ABDOMINAL PAIN: 0
ANAL BLEEDING: 0
CHEST TIGHTNESS: 1
CONSTIPATION: 0
VOMITING: 0
RECTAL PAIN: 0
NAUSEA: 0

## 2024-04-19 NOTE — PROGRESS NOTES
[facility] [place] [facility]  [facility] CARDIOLOGY  [address]  [address]  [facility] [address]  Dept: [phone]  Dept Fax: [phone]  Loc: [phone]    Visit Date: 4/19/2024    Maurice Bunch Jr. is a 48 y.o. male who presents todayfor:  Chief Complaint   Patient presents with    New Patient    Follow-Up from Hospital     From ER for chest pain     Hypertension    Hyperlipidemia    Chest Pain     Here from the ER   Chest pain   Intermittent in nature  Middle chest   No triggers   No radiation   At rest not exertional   Some dyspnea on exertion   Does have multiple risk factors for CAD  HTn and hyperlipidemia  As well DM for a while  Seen Tk in the past    cath before  Had mild disease in 2013  Smoking cig  Also marijuana   Family history of CAD    HPI:  Hypertension  Associated symptoms include chest pain and shortness of breath. Pertinent negatives include no neck pain.   Hyperlipidemia  Associated symptoms include chest pain and shortness of breath. Pertinent negatives include no myalgias.   Chest Pain   Associated symptoms include shortness of breath. Pertinent negatives include no abdominal pain, back pain, dizziness, nausea or vomiting.   His past medical history is significant for hyperlipidemia.     Past Medical History:   Diagnosis Date    Heartburn     Hyperlipidemia     Hypertension     Restless legs syndrome     Sleep apnea     SOB (shortness of breath)     Type II or unspecified type diabetes mellitus without mention of complication, not stated as uncontrolled       Past Surgical History:   Procedure Laterality Date    CYST REMOVAL      wrist     Family History   Problem Relation Age of Onset    Diabetes Father     Heart Disease Father      Social History     Tobacco Use    Smoking status: Every Day     Current packs/day: 0.50     Average packs/day: 0.5 packs/day for 16.0 years (8.0 ttl pk-yrs)     Types: Cigarettes    Smokeless tobacco: Never   Substance Use Topics

## 2024-05-08 ENCOUNTER — HOSPITAL ENCOUNTER (OUTPATIENT)
Dept: NUCLEAR MEDICINE | Age: 49
Discharge: HOME | End: 2024-05-08
Attending: NUCLEAR MEDICINE
Payer: COMMERCIAL

## 2024-05-08 ENCOUNTER — HOSPITAL ENCOUNTER (OUTPATIENT)
Age: 49
Discharge: HOME | End: 2024-05-10
Attending: NUCLEAR MEDICINE
Payer: COMMERCIAL

## 2024-05-08 VITALS
HEIGHT: 78 IN | WEIGHT: 315 LBS | BODY MASS INDEX: 36.45 KG/M2 | SYSTOLIC BLOOD PRESSURE: 122 MMHG | DIASTOLIC BLOOD PRESSURE: 62 MMHG

## 2024-05-08 DIAGNOSIS — I10 PRIMARY HYPERTENSION: ICD-10-CM

## 2024-05-08 DIAGNOSIS — E78.01 FAMILIAL HYPERCHOLESTEROLEMIA: ICD-10-CM

## 2024-05-08 DIAGNOSIS — R06.02 SHORTNESS OF BREATH: ICD-10-CM

## 2024-05-08 LAB
ECHO AV CUSP MM: 2 CM
ECHO AV PEAK GRADIENT: 9 MMHG
ECHO AV PEAK VELOCITY: 1.5 M/S
ECHO AV VELOCITY RATIO: 0.6
ECHO BSA: 2.95 M2
ECHO BSA: 2.95 M2
ECHO LA AREA 2C: 12.2 CM2
ECHO LA AREA 4C: 16.9 CM2
ECHO LA DIAMETER INDEX: 1.49 CM/M2
ECHO LA DIAMETER: 4.3 CM
ECHO LA MAJOR AXIS: 5.2 CM
ECHO LA MINOR AXIS: 4.7 CM
ECHO LA VOL BP: 36 ML (ref 18–58)
ECHO LA VOL MOD A2C: 26 ML (ref 18–58)
ECHO LA VOL MOD A4C: 46 ML (ref 18–58)
ECHO LA VOL/BSA BIPLANE: 13 ML/M2 (ref 16–34)
ECHO LA VOLUME INDEX MOD A2C: 9 ML/M2 (ref 16–34)
ECHO LA VOLUME INDEX MOD A4C: 16 ML/M2 (ref 16–34)
ECHO LV E' LATERAL VELOCITY: 8 CM/S
ECHO LV E' SEPTAL VELOCITY: 5 CM/S
ECHO LV FRACTIONAL SHORTENING: 31 % (ref 28–44)
ECHO LV INTERNAL DIMENSION DIASTOLE INDEX: 1.77 CM/M2
ECHO LV INTERNAL DIMENSION DIASTOLIC: 5.1 CM (ref 4.2–5.9)
ECHO LV INTERNAL DIMENSION SYSTOLIC INDEX: 1.22 CM/M2
ECHO LV INTERNAL DIMENSION SYSTOLIC: 3.5 CM
ECHO LV IVSD: 1.1 CM (ref 0.6–1)
ECHO LV MASS 2D: 227.4 G (ref 88–224)
ECHO LV MASS INDEX 2D: 79 G/M2 (ref 49–115)
ECHO LV POSTERIOR WALL DIASTOLIC: 1.2 CM (ref 0.6–1)
ECHO LV RELATIVE WALL THICKNESS RATIO: 0.47
ECHO LVOT PEAK GRADIENT: 3 MMHG
ECHO LVOT PEAK VELOCITY: 0.9 M/S
ECHO MV A VELOCITY: 0.84 M/S
ECHO MV E DECELERATION TIME (DT): 162 MS
ECHO MV E VELOCITY: 0.7 M/S
ECHO MV E/A RATIO: 0.83
ECHO MV E/E' LATERAL: 8.75
ECHO MV E/E' RATIO (AVERAGED): 11.38
ECHO MV REGURGITANT PEAK GRADIENT: 19 MMHG
ECHO MV REGURGITANT PEAK VELOCITY: 2.2 M/S
ECHO PV MAX VELOCITY: 0.9 M/S
ECHO PV PEAK GRADIENT: 3 MMHG
ECHO RV INTERNAL DIMENSION: 2.8 CM
ECHO TV E WAVE: 0.5 M/S
NUC STRESS EJECTION FRACTION: 48 %
STRESS BASELINE DIAS BP: 86 MMHG
STRESS BASELINE HR: 81 BPM
STRESS BASELINE SYS BP: 127 MMHG
STRESS STAGE 1 BP: NORMAL MMHG
STRESS STAGE 1 DURATION: 1 MIN:SEC
STRESS STAGE 1 HR: 77 BPM
STRESS STAGE 2 BP: NORMAL MMHG
STRESS STAGE 2 DURATION: 1 MIN:SEC
STRESS STAGE 2 HR: 91 BPM
STRESS STAGE 3 BP: NORMAL MMHG
STRESS STAGE 3 DURATION: 1 MIN:SEC
STRESS STAGE 3 HR: 84 BPM
STRESS STAGE RECOVERY 1 BP: NORMAL MMHG
STRESS STAGE RECOVERY 1 DURATION: 1 MIN:SEC
STRESS STAGE RECOVERY 1 HR: 80 BPM
STRESS STAGE RECOVERY 2 BP: NORMAL MMHG
STRESS STAGE RECOVERY 2 DURATION: 1 MIN:SEC
STRESS STAGE RECOVERY 2 HR: 79 BPM
STRESS STAGE RECOVERY 3 BP: NORMAL MMHG
STRESS STAGE RECOVERY 3 DURATION: 1 MIN:SEC
STRESS STAGE RECOVERY 3 HR: 80 BPM
STRESS STAGE RECOVERY 4 BP: NORMAL MMHG
STRESS STAGE RECOVERY 4 DURATION: 2 MIN:SEC
STRESS STAGE RECOVERY 4 HR: 83 BPM
STRESS TARGET HR: 172 BPM
TID: 1.24

## 2024-05-08 PROCEDURE — 78452 HT MUSCLE IMAGE SPECT MULT: CPT | Performed by: NUCLEAR MEDICINE

## 2024-05-08 PROCEDURE — 93306 TTE W/DOPPLER COMPLETE: CPT

## 2024-05-08 PROCEDURE — 93306 TTE W/DOPPLER COMPLETE: CPT | Performed by: NUCLEAR MEDICINE

## 2024-05-08 PROCEDURE — 3430000000 HC RX DIAGNOSTIC RADIOPHARMACEUTICAL: Performed by: NUCLEAR MEDICINE

## 2024-05-08 PROCEDURE — 6360000002 HC RX W HCPCS: Performed by: NUCLEAR MEDICINE

## 2024-05-08 PROCEDURE — 78452 HT MUSCLE IMAGE SPECT MULT: CPT

## 2024-05-08 PROCEDURE — 93016 CV STRESS TEST SUPVJ ONLY: CPT | Performed by: NUCLEAR MEDICINE

## 2024-05-08 PROCEDURE — A9500 TC99M SESTAMIBI: HCPCS | Performed by: NUCLEAR MEDICINE

## 2024-05-08 PROCEDURE — 93018 CV STRESS TEST I&R ONLY: CPT | Performed by: NUCLEAR MEDICINE

## 2024-05-08 PROCEDURE — 93017 CV STRESS TEST TRACING ONLY: CPT

## 2024-05-08 RX ORDER — TETRAKIS(2-METHOXYISOBUTYLISOCYANIDE)COPPER(I) TETRAFLUOROBORATE 1 MG/ML
10 INJECTION, POWDER, LYOPHILIZED, FOR SOLUTION INTRAVENOUS
Status: COMPLETED | OUTPATIENT
Start: 2024-05-08 | End: 2024-05-08

## 2024-05-08 RX ORDER — TETRAKIS(2-METHOXYISOBUTYLISOCYANIDE)COPPER(I) TETRAFLUOROBORATE 1 MG/ML
34.6 INJECTION, POWDER, LYOPHILIZED, FOR SOLUTION INTRAVENOUS
Status: COMPLETED | OUTPATIENT
Start: 2024-05-08 | End: 2024-05-08

## 2024-05-08 RX ORDER — REGADENOSON 0.08 MG/ML
0.4 INJECTION, SOLUTION INTRAVENOUS
Status: COMPLETED | OUTPATIENT
Start: 2024-05-08 | End: 2024-05-08

## 2024-05-08 RX ADMIN — Medication 34.6 MILLICURIE: at 11:28

## 2024-05-08 RX ADMIN — Medication 10 MILLICURIE: at 09:52

## 2024-05-08 RX ADMIN — REGADENOSON 0.4 MG: 0.08 INJECTION, SOLUTION INTRAVENOUS at 11:28

## 2024-07-24 ENCOUNTER — HOSPITAL ENCOUNTER (EMERGENCY)
Age: 49
Discharge: HOME | End: 2024-07-24
Attending: EMERGENCY MEDICINE
Payer: COMMERCIAL

## 2024-07-24 VITALS
HEIGHT: 78 IN | SYSTOLIC BLOOD PRESSURE: 133 MMHG | HEART RATE: 80 BPM | DIASTOLIC BLOOD PRESSURE: 88 MMHG | OXYGEN SATURATION: 91 % | RESPIRATION RATE: 20 BRPM | BODY MASS INDEX: 36.45 KG/M2 | TEMPERATURE: 99.3 F | WEIGHT: 315 LBS

## 2024-07-24 DIAGNOSIS — N17.9 AKI (ACUTE KIDNEY INJURY) (HCC): ICD-10-CM

## 2024-07-24 DIAGNOSIS — R79.89 ELEVATED TROPONIN: ICD-10-CM

## 2024-07-24 DIAGNOSIS — E86.0 DEHYDRATION: ICD-10-CM

## 2024-07-24 DIAGNOSIS — R42 DIZZINESS: Primary | ICD-10-CM

## 2024-07-24 LAB
ALBUMIN SERPL BCG-MCNC: 4.6 G/DL (ref 3.5–5.1)
ALP SERPL-CCNC: 45 U/L (ref 38–126)
ALT SERPL W/O P-5'-P-CCNC: 13 U/L (ref 11–66)
ANION GAP SERPL CALC-SCNC: 10 MEQ/L (ref 8–16)
AST SERPL-CCNC: 12 U/L (ref 5–40)
BASOPHILS ABSOLUTE: 0 THOU/MM3 (ref 0–0.1)
BASOPHILS NFR BLD AUTO: 0.3 %
BILIRUB SERPL-MCNC: 0.3 MG/DL (ref 0.3–1.2)
BUN SERPL-MCNC: 22 MG/DL (ref 7–22)
BURR CELLS BLD QL SMEAR: ABNORMAL
CALCIUM SERPL-MCNC: 10.3 MG/DL (ref 8.5–10.5)
CHLORIDE SERPL-SCNC: 96 MEQ/L (ref 98–111)
CO2 SERPL-SCNC: 28 MEQ/L (ref 23–33)
CREAT SERPL-MCNC: 2 MG/DL (ref 0.4–1.2)
DEPRECATED RDW RBC AUTO: 45.3 FL (ref 35–45)
EOSINOPHIL NFR BLD AUTO: 1.3 %
EOSINOPHILS ABSOLUTE: 0.1 THOU/MM3 (ref 0–0.4)
ERYTHROCYTE [DISTWIDTH] IN BLOOD BY AUTOMATED COUNT: 15.6 % (ref 11.5–14.5)
GFR SERPL CREATININE-BSD FRML MDRD: 40 ML/MIN/1.73M2
GLUCOSE SERPL-MCNC: 108 MG/DL (ref 70–108)
HCT VFR BLD AUTO: 47.9 % (ref 42–52)
HGB BLD-MCNC: 15.4 GM/DL (ref 14–18)
IMM GRANULOCYTES # BLD AUTO: 0.03 THOU/MM3 (ref 0–0.07)
IMM GRANULOCYTES NFR BLD AUTO: 0.4 %
LYMPHOCYTES ABSOLUTE: 2 THOU/MM3 (ref 1–4.8)
LYMPHOCYTES NFR BLD AUTO: 25.1 %
MAGNESIUM SERPL-MCNC: 1.7 MG/DL (ref 1.6–2.4)
MCH RBC QN AUTO: 27 PG (ref 26–33)
MCHC RBC AUTO-ENTMCNC: 32.2 GM/DL (ref 32.2–35.5)
MCV RBC AUTO: 84 FL (ref 80–94)
MONOCYTES ABSOLUTE: 0.8 THOU/MM3 (ref 0.4–1.3)
MONOCYTES NFR BLD AUTO: 9.7 %
NEUTROPHILS ABSOLUTE: 5.1 THOU/MM3 (ref 1.8–7.7)
NEUTROPHILS NFR BLD AUTO: 63.2 %
NRBC BLD AUTO-RTO: 0 /100 WBC
OSMOLALITY SERPL CALC.SUM OF ELEC: 272.1 MOSMOL/KG (ref 275–300)
PLATELET # BLD AUTO: 205 THOU/MM3 (ref 130–400)
PLATELET BLD QL SMEAR: ABNORMAL
PMV BLD AUTO: 11.4 FL (ref 9.4–12.4)
POTASSIUM SERPL-SCNC: 4.2 MEQ/L (ref 3.5–5.2)
PROT SERPL-MCNC: 7.7 G/DL (ref 6.1–8)
RBC # BLD AUTO: 5.7 MILL/MM3 (ref 4.7–6.1)
SCAN OF BLOOD SMEAR: NORMAL
SODIUM SERPL-SCNC: 134 MEQ/L (ref 135–145)
STOMATOCYTES: ABNORMAL
TROPONIN, HIGH SENSITIVITY: 20 NG/L (ref 0–12)
TROPONIN, HIGH SENSITIVITY: 24 NG/L (ref 0–12)
WBC # BLD AUTO: 8 THOU/MM3 (ref 4.8–10.8)

## 2024-07-24 PROCEDURE — 85025 COMPLETE CBC W/AUTO DIFF WBC: CPT

## 2024-07-24 PROCEDURE — 83735 ASSAY OF MAGNESIUM: CPT

## 2024-07-24 PROCEDURE — 93005 ELECTROCARDIOGRAM TRACING: CPT

## 2024-07-24 PROCEDURE — 80053 COMPREHEN METABOLIC PANEL: CPT

## 2024-07-24 PROCEDURE — 36415 COLL VENOUS BLD VENIPUNCTURE: CPT

## 2024-07-24 PROCEDURE — 6370000000 HC RX 637 (ALT 250 FOR IP)

## 2024-07-24 PROCEDURE — 99284 EMERGENCY DEPT VISIT MOD MDM: CPT

## 2024-07-24 PROCEDURE — 2580000003 HC RX 258

## 2024-07-24 PROCEDURE — 84484 ASSAY OF TROPONIN QUANT: CPT

## 2024-07-24 PROCEDURE — 96360 HYDRATION IV INFUSION INIT: CPT

## 2024-07-24 RX ORDER — 0.9 % SODIUM CHLORIDE 0.9 %
1000 INTRAVENOUS SOLUTION INTRAVENOUS ONCE
Status: COMPLETED | OUTPATIENT
Start: 2024-07-24 | End: 2024-07-24

## 2024-07-24 RX ORDER — MECLIZINE HCL 25MG 25 MG/1
25 TABLET, CHEWABLE ORAL ONCE
Status: COMPLETED | OUTPATIENT
Start: 2024-07-24 | End: 2024-07-24

## 2024-07-24 RX ADMIN — MECLIZINE HYDROCHLORIDE 25 MG: 25 TABLET, CHEWABLE ORAL at 18:18

## 2024-07-24 RX ADMIN — SODIUM CHLORIDE 1000 ML: 9 INJECTION, SOLUTION INTRAVENOUS at 18:19

## 2024-07-24 NOTE — ED TRIAGE NOTES
PT presents to the ER with c/o dizziness x2-3 weeks. Pt also reports he has had 3 episodes of emesis. Pt states he went to UC and was given Zofran. Pt denies LOC, numbness, or falls. Pt is ambulatory to room, alert and oriented, respirations even and unlabored. VSS

## 2024-07-24 NOTE — ED NOTES
Updated patient on plan of care, patient gave verbal understanding. Orthostatic vitals signs completed, patient tolerated well. Patient medicated per MAR. No other needs or concerns expressed. VSS

## 2024-07-24 NOTE — ED PROVIDER NOTES
reviewed and interpreted by me in the clinical context of this patient.  See ED course below for results interpretation if applicable.  (Any cultures that may have been sent were not resulted at the time of this patient ED visit)      Radiologic studies results available at the moment of this note (None if blank):  No orders to display     See ED course below for my interpretation if applicable.  All radiology images independently reviewed by me in the clinical context of this patient, in addition to interpretation provided by the radiologist.      EKG interpretation (none if blank):  EKG shows normal sinus rhythm at a rate of 94 bpm, P interval 128, QRS 70, QTc 420, no ST segment elevation or depression-no STEMI.    All EKG results are individually reviewed and interpreted by me in the clinical context of this patient.  All EKGs are also interpreted by our Cardiology department, final interpretation may not be available as of the writing of this note.      MEDICAL DECISION MAKING   Initial Assessment Summary:   Patient 49-year-old male presents emergency department today for intermittent dizziness.  Please see ED course section below for continuation and resolution of this initial assessment if applicable.      Comorbid conditions pertinent to this ED encounter:  Type 2 diabetes, hypertension, hyperlipidemia, ED    Differential Diagnosis includes but is not limited to:  Orthostatic hypotension  Cardiac arrhythmia  Electrolyte abnormality  BPPV      Decision Rules/Clinical Scores utilized:        Plan:   Labs, EKG, meclizine, fluid bolus, orthostatic vitals      Code Status:  Not addressed during this ED visit    Social determinants of health impacting treatment or disposition:  Not Applicable.      PREVIOUS RECORDS  AND EXTERNAL INFORMATION REVIEWED   History obtained from: chart review and the patient.    Pertinent previous and/or external records reviewed: Noncontributory.    Case discussed with specialties other

## 2024-07-25 LAB
EKG ATRIAL RATE: 94 BPM
EKG P AXIS: 52 DEGREES
EKG P-R INTERVAL: 128 MS
EKG Q-T INTERVAL: 336 MS
EKG QRS DURATION: 70 MS
EKG QTC CALCULATION (BAZETT): 420 MS
EKG R AXIS: -10 DEGREES
EKG T AXIS: 28 DEGREES
EKG VENTRICULAR RATE: 94 BPM

## 2024-07-25 PROCEDURE — 93010 ELECTROCARDIOGRAM REPORT: CPT | Performed by: INTERNAL MEDICINE

## 2024-07-27 ASSESSMENT — ENCOUNTER SYMPTOMS
ABDOMINAL PAIN: 0
SHORTNESS OF BREATH: 0
SORE THROAT: 0
COUGH: 0
RHINORRHEA: 0
EYE PAIN: 0